# Patient Record
Sex: MALE | Race: WHITE | NOT HISPANIC OR LATINO | Employment: OTHER | URBAN - NONMETROPOLITAN AREA
[De-identification: names, ages, dates, MRNs, and addresses within clinical notes are randomized per-mention and may not be internally consistent; named-entity substitution may affect disease eponyms.]

---

## 2017-06-06 ENCOUNTER — OFFICE VISIT (OUTPATIENT)
Dept: RETAIL CLINIC | Facility: CLINIC | Age: 52
End: 2017-06-06

## 2017-06-06 DIAGNOSIS — R68.89 SUSPECTED LYME DISEASE: ICD-10-CM

## 2017-06-06 DIAGNOSIS — Z76.89 REFERRAL OF PATIENT WITHOUT EXAMINATION OR TREATMENT: Primary | ICD-10-CM

## 2017-06-06 PROCEDURE — 86618 LYME DISEASE ANTIBODY: CPT | Performed by: NURSE PRACTITIONER

## 2017-06-06 PROCEDURE — 86666 EHRLICHIA ANTIBODY: CPT | Performed by: NURSE PRACTITIONER

## 2020-08-31 PROCEDURE — U0003 INFECTIOUS AGENT DETECTION BY NUCLEIC ACID (DNA OR RNA); SEVERE ACUTE RESPIRATORY SYNDROME CORONAVIRUS 2 (SARS-COV-2) (CORONAVIRUS DISEASE [COVID-19]), AMPLIFIED PROBE TECHNIQUE, MAKING USE OF HIGH THROUGHPUT TECHNOLOGIES AS DESCRIBED BY CMS-2020-01-R: HCPCS | Performed by: NURSE PRACTITIONER

## 2021-01-09 ENCOUNTER — HOSPITAL ENCOUNTER (OUTPATIENT)
Dept: GENERAL RADIOLOGY | Facility: HOSPITAL | Age: 56
Discharge: HOME | End: 2021-01-09
Admitting: NURSE PRACTITIONER

## 2021-01-09 PROCEDURE — 80053 COMPREHEN METABOLIC PANEL: CPT | Performed by: NURSE PRACTITIONER

## 2021-01-09 PROCEDURE — 70360 X-RAY EXAM OF NECK: CPT

## 2021-01-09 PROCEDURE — 85025 COMPLETE CBC W/AUTO DIFF WBC: CPT | Performed by: NURSE PRACTITIONER

## 2021-01-13 ENCOUNTER — TELEPHONE (OUTPATIENT)
Dept: ENT CLINIC | Age: 56
End: 2021-01-13

## 2021-01-14 ENCOUNTER — TELEPHONE (OUTPATIENT)
Dept: OTOLARYNGOLOGY | Facility: CLINIC | Age: 56
End: 2021-01-14

## 2021-01-14 NOTE — TELEPHONE ENCOUNTER
Called to schedule an appointment from a referral. Patient answered and stated the the  put him on some medication last week and he thinks it is helping. He said that he is going to finish his medication and  Then see how he feels. I told him if she still felt like he needed to come in after he completed his medication that he could call us and we would put him on the schedule.

## 2022-11-08 ENCOUNTER — HOSPITAL ENCOUNTER (EMERGENCY)
Age: 57
Discharge: HOME | End: 2022-11-08
Attending: EMERGENCY MEDICINE
Payer: MEDICARE

## 2022-11-08 ENCOUNTER — APPOINTMENT (OUTPATIENT)
Dept: MRI IMAGING | Age: 57
End: 2022-11-08
Payer: MEDICARE

## 2022-11-08 VITALS
TEMPERATURE: 98.9 F | SYSTOLIC BLOOD PRESSURE: 114 MMHG | HEART RATE: 100 BPM | DIASTOLIC BLOOD PRESSURE: 82 MMHG | OXYGEN SATURATION: 99 % | RESPIRATION RATE: 18 BRPM

## 2022-11-08 DIAGNOSIS — M54.9 CHRONIC BACK PAIN, UNSPECIFIED BACK LOCATION, UNSPECIFIED BACK PAIN LATERALITY: Primary | ICD-10-CM

## 2022-11-08 DIAGNOSIS — G89.29 CHRONIC BACK PAIN, UNSPECIFIED BACK LOCATION, UNSPECIFIED BACK PAIN LATERALITY: Primary | ICD-10-CM

## 2022-11-08 LAB
ALBUMIN SERPL-MCNC: 3.9 G/DL (ref 3.5–5.2)
ALP BLD-CCNC: 60 U/L (ref 40–130)
ALT SERPL-CCNC: 32 U/L (ref 5–41)
ANION GAP SERPL CALCULATED.3IONS-SCNC: 7 MMOL/L (ref 7–19)
AST SERPL-CCNC: 22 U/L (ref 5–40)
BASOPHILS ABSOLUTE: 0 K/UL (ref 0–0.2)
BASOPHILS RELATIVE PERCENT: 0.2 % (ref 0–1)
BILIRUB SERPL-MCNC: 0.4 MG/DL (ref 0.2–1.2)
BUN BLDV-MCNC: 32 MG/DL (ref 6–20)
C-REACTIVE PROTEIN: <0.3 MG/DL (ref 0–0.5)
CALCIUM SERPL-MCNC: 9.1 MG/DL (ref 8.6–10)
CHLORIDE BLD-SCNC: 106 MMOL/L (ref 98–111)
CO2: 24 MMOL/L (ref 22–29)
CREAT SERPL-MCNC: 0.6 MG/DL (ref 0.5–1.2)
EOSINOPHILS ABSOLUTE: 0.1 K/UL (ref 0–0.6)
EOSINOPHILS RELATIVE PERCENT: 0.8 % (ref 0–5)
GFR SERPL CREATININE-BSD FRML MDRD: >60 ML/MIN/{1.73_M2}
GLUCOSE BLD-MCNC: 108 MG/DL (ref 74–109)
HCT VFR BLD CALC: 40 % (ref 42–52)
HEMOGLOBIN: 13.3 G/DL (ref 14–18)
IMMATURE GRANULOCYTES #: 0 K/UL
LYMPHOCYTES ABSOLUTE: 2.2 K/UL (ref 1.1–4.5)
LYMPHOCYTES RELATIVE PERCENT: 26.9 % (ref 20–40)
MCH RBC QN AUTO: 31.1 PG (ref 27–31)
MCHC RBC AUTO-ENTMCNC: 33.3 G/DL (ref 33–37)
MCV RBC AUTO: 93.7 FL (ref 80–94)
MONOCYTES ABSOLUTE: 0.8 K/UL (ref 0–0.9)
MONOCYTES RELATIVE PERCENT: 9 % (ref 0–10)
NEUTROPHILS ABSOLUTE: 5.2 K/UL (ref 1.5–7.5)
NEUTROPHILS RELATIVE PERCENT: 62.7 % (ref 50–65)
PDW BLD-RTO: 13.9 % (ref 11.5–14.5)
PLATELET # BLD: 159 K/UL (ref 130–400)
PMV BLD AUTO: 11.3 FL (ref 9.4–12.4)
POTASSIUM SERPL-SCNC: 4.1 MMOL/L (ref 3.5–5)
RBC # BLD: 4.27 M/UL (ref 4.7–6.1)
SEDIMENTATION RATE, ERYTHROCYTE: 63 MM/HR (ref 0–15)
SODIUM BLD-SCNC: 137 MMOL/L (ref 136–145)
TOTAL PROTEIN: 7.3 G/DL (ref 6.6–8.7)
WBC # BLD: 8.3 K/UL (ref 4.8–10.8)

## 2022-11-08 PROCEDURE — 72141 MRI NECK SPINE W/O DYE: CPT

## 2022-11-08 PROCEDURE — 6360000002 HC RX W HCPCS: Performed by: EMERGENCY MEDICINE

## 2022-11-08 PROCEDURE — 72146 MRI CHEST SPINE W/O DYE: CPT

## 2022-11-08 PROCEDURE — 96375 TX/PRO/DX INJ NEW DRUG ADDON: CPT

## 2022-11-08 PROCEDURE — 85652 RBC SED RATE AUTOMATED: CPT

## 2022-11-08 PROCEDURE — 36415 COLL VENOUS BLD VENIPUNCTURE: CPT

## 2022-11-08 PROCEDURE — 72148 MRI LUMBAR SPINE W/O DYE: CPT

## 2022-11-08 PROCEDURE — 86140 C-REACTIVE PROTEIN: CPT

## 2022-11-08 PROCEDURE — 85025 COMPLETE CBC W/AUTO DIFF WBC: CPT

## 2022-11-08 PROCEDURE — 96374 THER/PROPH/DIAG INJ IV PUSH: CPT

## 2022-11-08 PROCEDURE — 99284 EMERGENCY DEPT VISIT MOD MDM: CPT

## 2022-11-08 PROCEDURE — 72141 MRI NECK SPINE W/O DYE: CPT | Performed by: RADIOLOGY

## 2022-11-08 PROCEDURE — 80053 COMPREHEN METABOLIC PANEL: CPT

## 2022-11-08 RX ORDER — METHYLPREDNISOLONE 4 MG/1
TABLET ORAL
Qty: 1 KIT | Refills: 0 | Status: ON HOLD | OUTPATIENT
Start: 2022-11-08 | End: 2022-11-28 | Stop reason: HOSPADM

## 2022-11-08 RX ORDER — GABAPENTIN 400 MG/1
600 CAPSULE ORAL 3 TIMES DAILY
Status: ON HOLD | COMMUNITY
Start: 2020-08-28 | End: 2022-11-28 | Stop reason: HOSPADM

## 2022-11-08 RX ORDER — MORPHINE SULFATE 4 MG/ML
4 INJECTION, SOLUTION INTRAMUSCULAR; INTRAVENOUS ONCE
Status: COMPLETED | OUTPATIENT
Start: 2022-11-08 | End: 2022-11-08

## 2022-11-08 RX ORDER — TIZANIDINE 4 MG/1
4 TABLET ORAL EVERY 6 HOURS PRN
COMMUNITY

## 2022-11-08 RX ORDER — DEXAMETHASONE SODIUM PHOSPHATE 10 MG/ML
10 INJECTION, SOLUTION INTRAMUSCULAR; INTRAVENOUS ONCE
Status: COMPLETED | OUTPATIENT
Start: 2022-11-08 | End: 2022-11-08

## 2022-11-08 RX ORDER — CEPHALEXIN 500 MG/1
500 CAPSULE ORAL 4 TIMES DAILY
Qty: 28 CAPSULE | Refills: 0 | Status: SHIPPED | OUTPATIENT
Start: 2022-11-08 | End: 2022-11-15

## 2022-11-08 RX ORDER — OXYCODONE AND ACETAMINOPHEN 7.5; 325 MG/1; MG/1
1 TABLET ORAL EVERY 6 HOURS PRN
Status: ON HOLD | COMMUNITY
End: 2022-11-20

## 2022-11-08 RX ORDER — MELOXICAM 7.5 MG/1
7.5 TABLET ORAL DAILY
COMMUNITY
Start: 2020-07-13

## 2022-11-08 RX ORDER — ONDANSETRON 2 MG/ML
4 INJECTION INTRAMUSCULAR; INTRAVENOUS ONCE
Status: COMPLETED | OUTPATIENT
Start: 2022-11-08 | End: 2022-11-08

## 2022-11-08 RX ADMIN — DEXAMETHASONE SODIUM PHOSPHATE 10 MG: 10 INJECTION, SOLUTION INTRAMUSCULAR; INTRAVENOUS at 23:39

## 2022-11-08 RX ADMIN — MORPHINE SULFATE 4 MG: 4 INJECTION, SOLUTION INTRAMUSCULAR; INTRAVENOUS at 20:23

## 2022-11-08 RX ADMIN — ONDANSETRON 4 MG: 2 INJECTION INTRAMUSCULAR; INTRAVENOUS at 20:24

## 2022-11-08 ASSESSMENT — ENCOUNTER SYMPTOMS
RECTAL PAIN: 0
CHEST TIGHTNESS: 0
FACIAL SWELLING: 0
DIARRHEA: 0
PHOTOPHOBIA: 0
EYE DISCHARGE: 0
ABDOMINAL PAIN: 0
WHEEZING: 0
NAUSEA: 0
BACK PAIN: 1
SHORTNESS OF BREATH: 0
CONSTIPATION: 0
EYE ITCHING: 0
COUGH: 0
EYE PAIN: 0

## 2022-11-08 ASSESSMENT — PAIN SCALES - GENERAL: PAINLEVEL_OUTOF10: 3

## 2022-11-08 ASSESSMENT — PAIN DESCRIPTION - LOCATION: LOCATION: BACK

## 2022-11-09 NOTE — ED PROVIDER NOTES
[facility] EMERGENCY DEPT  eMERGENCYdEPARTMENT eNCOUnter      Pt Name: Karime Sams  MRN: [id number]  [place] 1965  Date of evaluation: 11/8/2022  Betty Wharton MD    Emergency Department care of this patient was assumed at 2200 from Dr. Benja Rg. We have discussed the case and the plan of care. I have seen and evaluated patient and reviewed ED course. CHIEF COMPLAINT       Chief Complaint   Patient presents with    Back Pain     Ongoing for several years. Family states he has insect bite, is dragging leg, has neck and back issues. PHYSICAL EXAM    (up to 7 for level 4, 8 or more for level 5)     ED Triage Vitals [11/08/22 1645]   BP Temp Temp Source Heart Rate Resp SpO2 Height Weight   (!) 142/98 98.9 °F (37.2 °C) Oral (!) 105 18 99 % -- --       Physical Exam    DIAGNOSTIC RESULTS     EKG: All EKG's are interpreted by the Emergency Department Physician who either signs or Co-signs this chart inthe absence of a cardiologist.        RADIOLOGY:   Non-plain film imagessuch as CT, Ultrasound and MRI are read by the radiologist. Plain radiographic images are visualized and preliminarily interpreted by the emergency physician with the below findings:          MRI LUMBAR SPINE WO CONTRAST   Final Result   1. Severe bilateral foraminal stenosis at L5-S1, multifactorial.  Discogenic marrow edema  indicates a more acute component to the degenerative change at this level. 2. Degenerative change elsewhere in the lumbar spine, most pronounced    at T11-T12 on the left, L1-L2 where there is borderline central spinal stenosis, and L2-L3 where there is moderate right foraminal stenosis. 3. No abnormal signal in the conus. 4. Other than borderline central spinal stenosis at L1-L2, central spinal    canal is patent throughout. Electronically signed by Clementina Holt M.D. on 11-08-22 at [facility]   Final Result   1.   Moderate to severe degenerative disc disease throughout the Patient is dead based on Cardiopulmonary criteria including absent breath sounds, pulselessness and/or asystole thoracic spine, including multilevel areas of discogenic marrow edema. 2. Findings are most severe at T2-3 and T9-10, where there is borderline central spinal stenosis. 3. No    isolated disc herniation, significant central spinal stenosis or abnormal cord signal..Electronically signed by Ada Paniagua M.D. on 11-08-22 at 2247       [facility]   Final Result   1. Cervical spondylosis. 2. Mild retrolisthesis of C3 over C4.   3. Varying degrees of disc osteophyte complex, facet arthropathy, uncovertebral arthropathy causing spinal canal, neural foraminal narrowing with nerve root impingement as detailed above. 4. Spinal canal stenosis with cord compression at C3, C4 levels. Recommendation:    Follow up as clinically indicated. Electronically Signed by Carina Burk MD at 08-Nov-2022 09:46:14 PM EST                       LABS:  Labs Reviewed   CBC WITH AUTO DIFFERENTIAL - Abnormal; Notable for the following components:       Result Value    RBC 4.27 (*)     Hemoglobin 13.3 (*)     Hematocrit 40.0 (*)     MCH 31.1 (*)     All other components within normal limits   COMPREHENSIVE METABOLIC PANEL - Abnormal; Notable for the following components:    BUN 32 (*)     All other components within normal limits   SEDIMENTATION RATE - Abnormal; Notable for the following components:    Sed Rate 63 (*)     All other components within normal limits   C-REACTIVE PROTEIN       All other labs were within normal range or not returned as of this dictation.     EMERGENCY DEPARTMENT COURSE and DIFFERENTIAL DIAGNOSIS/MDM:   Vitals:    Vitals:    11/08/22 1645 11/08/22 2350   BP: (!) 142/98 114/82   Pulse: (!) 105 100   Resp: 18 18   Temp: 98.9 °F (37.2 °C)    TempSrc: Oral    SpO2: 99%        MDM     Amount and/or Complexity of Data Reviewed  Tests in the radiology section of CPT®: ordered and reviewed  Discuss the patient with other providers: yes      Patient is able to ambulate here with minimal difficulty. No obvious focal weakness mild weakness appreciated in his  strength and against resistance of his lower extremities. No bowel or bladder incontinence history or saddle anesthesia. Patient has dealt with chronic back pain issues for many years followed by pain management and has seen Dr. Talia Pritchard. Has had worsening symptoms over the past 1 week describes electric jolt sensations in his extremities. Assumed care tonight from Dr. Dada Cruz to follow-up MRI thoracic and lumbar spine with plans for discussion with neurosurgery for their input. Pt also has a small bug bite with plan to send home with keflex if DC. Discussed the patients case and all imaging with Dr. Matthew Nog. [id number] Nas[address] Roy for close follow up with him or Dr. Joan Finn and start on steroid. Discussed strict return precautions with patient. CONSULTS:  None    PROCEDURES:  Unless otherwise noted below, none     Procedures    FINAL IMPRESSION      1.  Chronic back pain, unspecified back location, unspecified back pain laterality          DISPOSITION/PLAN   DISPOSITION Decision To Discharge    PATIENT REFERRED TO:  MD Mitchell Gordillo [address] [place] [address]  [phone]          DISCHARGE MEDICATIONS:  Discharge Medication List as of 11/8/2022 11:47 PM        START taking these medications    Details   methylPREDNISolone (MEDROL, ALIYAH,) 4 MG tablet Take by mouth., Disp-1 kit, R-0Normal                (Please note that portions ofthis note were completed with a voice recognition program.  Efforts were made to edit the dictations but occasionally words are mis-transcribed.)    David Blanco MD(electronically signed)  Attending Emergency Physician         Cecelia Bernabe MD  11/09/22 1336

## 2022-11-09 NOTE — ED PROVIDER NOTES
[facility] EMERGENCY DEPT  eMERGENCY dEPARTMENT eNCOUnter      Pt Name: Juliette Aleman  MRN: [id number]  [place] 1965  Date of evaluation: 11/8/2022  Provider: Sariah Monique MD    CHIEF COMPLAINT       Chief Complaint   Patient presents with    Back Pain     Ongoing for several years. Family states he has insect bite, is dragging leg, has neck and back issues. HISTORY OF PRESENT ILLNESS   (Location/Symptom, Timing/Onset,Context/Setting, Quality, Duration, Modifying Factors, Severity)  Note limiting factors. Juliette Aleman is a 62 y.o. male who presents to the emergency department numb hands and bilateral lower extremity paresthesia. HPI    Patient is a 78-year-old white male with a long history of neck; back ;and lumbar issues had been seen by pain management in the orthopedic [facility] [address] [place] [address] for years. He is on gabapentin; oxycodone and zanaflex. He last epidural injections in February 2022; notes now with worsening symptoms including new findings including bilateral hand paresthesia; electric shocks when he moves his neck; electric shock paresthesia down his legs and back when he tries to stand up bilaterally. Can barely walk and drags his feet and his right lower extremity gives out frequently when he tries to ambulate. He is told that he needs surgery but has refused to obtain it. At this time, Imaging studies are unavailable as are history of the patient's prior episodes and treatment. Having trouble controlling his legs and being able to stand upright which is what brought him to the ED. He has a pending surgical evaluation in the next few weeks. He denies focal weakness; fever; change in bowel or bladder control. He is not here for pain control but requests diagnostic studies because he thinks his condition is acutely worsening over the past week. NursingNotes were reviewed.     REVIEW OF SYSTEMS    (2-9 systems for level 4, 10 or more for level 5)     Review of Systems   Constitutional:  Negative for activity change, appetite change, chills, diaphoresis, fatigue and fever. HENT:  Negative for congestion, facial swelling, hearing loss and nosebleeds. Eyes:  Negative for photophobia, pain, discharge and itching. Respiratory:  Negative for cough, chest tightness, shortness of breath and wheezing. Cardiovascular:  Negative for chest pain and palpitations. Gastrointestinal:  Negative for abdominal pain, constipation, diarrhea, nausea and rectal pain. Endocrine: Negative for cold intolerance and heat intolerance. Genitourinary:  Negative for difficulty urinating and dysuria. Musculoskeletal:  Positive for back pain, gait problem and neck pain. Negative for arthralgias, joint swelling and neck stiffness. Skin:  Negative for pallor and rash. Neurological:  Positive for weakness and numbness. Negative for dizziness, speech difficulty and headaches. Psychiatric/Behavioral:  Negative for agitation, behavioral problems and decreased concentration. PAST MEDICALHISTORY   History reviewed. No pertinent past medical history. SURGICAL HISTORY     History reviewed. No pertinent surgical history. CURRENT MEDICATIONS     Discharge Medication List as of 11/8/2022 11:47 PM        CONTINUE these medications which have NOT CHANGED    Details   gabapentin (NEURONTIN) 400 MG capsule Take 400 mg by mouth 3 times daily. Historical Med      meloxicam (MOBIC) 7.5 MG tablet Take 7.5 mg by mouth dailyHistorical Med      oxyCODONE-acetaminophen (PERCOCET) 7.5-325 MG per tablet Take 1 tablet by mouth 3 times daily. Historical Med      tiZANidine (ZANAFLEX) 4 MG tablet Take 4 mg by mouth every 6 hours as neededHistorical Med             ALLERGIES     Patient has no known allergies. FAMILY HISTORY     History reviewed. No pertinent family history.        SOCIAL HISTORY       Social History     Socioeconomic History    Marital status: Legally      Spouse name: None    Number of children: None    Years of education: None    Highest education level: None   Tobacco Use    Smoking status: Every Day     Types: Cigarettes    Smokeless tobacco: Never   Substance and Sexual Activity    Alcohol use: Not Currently    Drug use: Not Currently       SCREENINGS    Leon Coma Scale  Eye Opening: Spontaneous  Best Verbal Response: Oriented  Best Motor Response: Obeys commands  Leon Coma Scale Score: 15        PHYSICAL EXAM    (up to 7 for level 4, 8 or more for level 5)     ED Triage Vitals [11/08/22 1645]   BP Temp Temp Source Heart Rate Resp SpO2 Height Weight   (!) 142/98 98.9 °F (37.2 °C) Oral (!) 105 18 99 % -- --       Physical Exam  Vitals and nursing note reviewed. Constitutional:       Appearance: He is well-developed. HENT:      Head: Normocephalic and atraumatic. Mouth/Throat:      Mouth: Mucous membranes are moist.   Eyes:      General:         Right eye: No discharge. Left eye: No discharge. Conjunctiva/sclera: Conjunctivae normal.      Pupils: Pupils are equal, round, and reactive to light. Neck:      Comments: Diffuse paraspinal and midline pain  Cardiovascular:      Rate and Rhythm: Normal rate and regular rhythm. Heart sounds: Normal heart sounds. Pulmonary:      Effort: Pulmonary effort is normal. No respiratory distress. Breath sounds: Normal breath sounds. No wheezing or rales. Abdominal:      General: Bowel sounds are normal.      Palpations: Abdomen is soft. There is no mass. Tenderness: There is no abdominal tenderness. There is no guarding or rebound. Musculoskeletal:         General: No tenderness. Normal range of motion. Cervical back: Normal range of motion and neck supple. Comments: mid thoracic and lower lumbar spinal and paraspinal pain   Skin:     General: Skin is warm and dry. Capillary Refill: Capillary refill takes less than 2 seconds.    Neurological:      Mental Status: He is alert and oriented to person, place, and time. Sensory: Sensory deficit present. Gait: Gait abnormal.      Comments: Numbness to bilateral hands; paresthesias to bilateral legs; denies perianal anesthesia or focal weakness   Psychiatric:         Behavior: Behavior normal.         Thought Content: Thought content normal.         Judgment: Judgment normal.     DIAGNOSTIC RESULTS     EKG: All EKG's areinterpreted by the Emergency Department Physician who either signs or Co-signs this chart in the absence of a cardiologist.        RADIOLOGY:  Non-plain film images such as CT, Ultrasound and MRI are read by the radiologist. Plain radiographic images are visualized and preliminarily interpreted bythe emergency physician with the below findings:          MRI LUMBAR SPINE WO CONTRAST   Final Result   1. Severe bilateral foraminal stenosis at L5-S1, multifactorial.  Discogenic marrow edema  indicates a more acute component to the degenerative change at this level. 2. Degenerative change elsewhere in the lumbar spine, most pronounced    at T11-T12 on the left, L1-L2 where there is borderline central spinal stenosis, and L2-L3 where there is moderate right foraminal stenosis. 3. No abnormal signal in the conus. 4. Other than borderline central spinal stenosis at L1-L2, central spinal    canal is patent throughout. Electronically signed by Yamilet Nunes M.D. on 11-08-22 at [facility]   Final Result   1. Moderate to severe degenerative disc disease throughout the thoracic spine, including multilevel areas of discogenic marrow edema. 2. Findings are most severe at T2-3 and T9-10, where there is borderline central spinal stenosis. 3. No    isolated disc herniation, significant central spinal stenosis or abnormal cord signal..Electronically signed by Yamilet Nunes M.D. on 11-08-22 at [address] [place] [address]   Final Result   1. Cervical spondylosis.    2. Mild retrolisthesis of C3 over C4.   3. Varying degrees of disc osteophyte complex, facet arthropathy, uncovertebral arthropathy causing spinal canal, neural foraminal narrowing with nerve root impingement as detailed above. 4. Spinal canal stenosis with cord compression at C3, C4 levels. Recommendation:    Follow up as clinically indicated. Electronically Signed by Ericka Castro MD at 08-Nov-2022 09:46:14 PM EST                       LABS:  Labs Reviewed   CBC WITH AUTO DIFFERENTIAL - Abnormal; Notable for the following components:       Result Value    RBC 4.27 (*)     Hemoglobin 13.3 (*)     Hematocrit 40.0 (*)     MCH 31.1 (*)     All other components within normal limits   COMPREHENSIVE METABOLIC PANEL - Abnormal; Notable for the following components:    BUN 32 (*)     All other components within normal limits   SEDIMENTATION RATE - Abnormal; Notable for the following components:    Sed Rate 63 (*)     All other components within normal limits   C-REACTIVE PROTEIN       All other labs were within normal range or not returned as of this dictation. EMERGENCY DEPARTMENT COURSE and DIFFERENTIAL DIAGNOSIS/MDM:   Vitals:    Vitals:    11/08/22 1645 11/08/22 2350   BP: (!) 142/98 114/82   Pulse: (!) 105 100   Resp: 18 18   Temp: 98.9 °F (37.2 °C)    TempSrc: Oral    SpO2: 99%        MDM    Pain controlled in the  ED. Endorsed to Dr. Yosvany Saldana to follow up on MRI results. CONSULTS:  None    PROCEDURES:  Unless otherwise noted below, none     Procedures    FINAL IMPRESSION      1.  Chronic back pain, unspecified back location, unspecified back pain laterality          DISPOSITION/PLAN   DISPOSITION Decision To Discharge 11/08/2022 11:31:10 PM      PATIENT REFERRED TO:  MD Mitchell Dobbs [address] [place] [address] Nadya Bryan MD  [address] [place] [address]  [phone]          DISCHARGE MEDICATIONS:  Discharge Medication List as of 11/8/2022 11:47 PM        START

## 2022-11-10 ENCOUNTER — TELEPHONE (OUTPATIENT)
Dept: NEUROSURGERY | Age: 57
End: 2022-11-10

## 2022-11-10 DIAGNOSIS — M54.9 BACK PAIN, UNSPECIFIED BACK LOCATION, UNSPECIFIED BACK PAIN LATERALITY, UNSPECIFIED CHRONICITY: Primary | ICD-10-CM

## 2022-11-10 NOTE — TELEPHONE ENCOUNTER
1st attempt to contact patient.  I left a voicemail instructing patient to call back at [phone] to schedule their new patient appointment     CALL ABOUT ED REFERRAL    BACK PAIN    MRI

## 2022-11-10 NOTE — TELEPHONE ENCOUNTER
[facility] New Patient Questionnaire    Diagnosis/Reason for Referral?    BACK PAIN, ED REFERRAL    2. Who is completing questionnaire? Patient  Caregiver Family      3. Has the patient had any previous spinal/brain surgeries? NO      A. If yes, what is the name of the facility in which the surgery was performed? B. Procedure/Surgery performed? C. Who was the surgeon? D. When was the surgery? MM/YY       E. Did the patient improve after the surgery? 4. Is this a second opinion? If yes, Dr. Vivian Mensah would like to review patient first before making the appointment. NO    5. Have MRI Images been obtain within the last year? Yes  No      XR  CT     If yes, where was the imaging performed? [facility]   If yes, what part of the body? Lumbar  Cervical  Thoracic  Brain     If yes, when was it obtained? 11/8/22    Note: if the scan was performed at a facility other than [address] [place] [address], the disc will need to be brought to the appointment or we need to reach out to obtain the disc. A. Was the patient instructed to provide the disc? Yes   No      8. Has the patient had a NCV/EMG within the last year? Yes  No     If yes, where was it performed and date? MM/YY  Location:      9. Has the patient been to Physical Therapy? Yes  No     If yes, what location, how long attended, and last visit? Location:        Therapy Lasted:    Date of Last Visit:      10. Has the patient been to Pain Management? Yes  No     If yes, what location and last visit     [facility]   Last Visit: LAST WEEK   Is it helping?  NO

## 2022-11-14 ENCOUNTER — OFFICE VISIT (OUTPATIENT)
Dept: NEUROSURGERY | Age: 57
End: 2022-11-14
Payer: MEDICARE

## 2022-11-14 VITALS
SYSTOLIC BLOOD PRESSURE: 145 MMHG | RESPIRATION RATE: 18 BRPM | HEART RATE: 80 BPM | WEIGHT: 165 LBS | BODY MASS INDEX: 25.9 KG/M2 | HEIGHT: 67 IN | DIASTOLIC BLOOD PRESSURE: 82 MMHG

## 2022-11-14 DIAGNOSIS — M43.17 SPONDYLOLISTHESIS OF LUMBOSACRAL REGION: ICD-10-CM

## 2022-11-14 DIAGNOSIS — M50.30 DDD (DEGENERATIVE DISC DISEASE), CERVICAL: ICD-10-CM

## 2022-11-14 DIAGNOSIS — G95.20 CERVICAL SPINAL CORD COMPRESSION (HCC): ICD-10-CM

## 2022-11-14 DIAGNOSIS — G95.9 CERVICAL MYELOPATHY (HCC): Primary | ICD-10-CM

## 2022-11-14 PROCEDURE — 99205 OFFICE O/P NEW HI 60 MIN: CPT | Performed by: NEUROLOGICAL SURGERY

## 2022-11-14 ASSESSMENT — ENCOUNTER SYMPTOMS
EYES NEGATIVE: 1
GASTROINTESTINAL NEGATIVE: 1
BACK PAIN: 1
RESPIRATORY NEGATIVE: 1

## 2022-11-14 NOTE — PROGRESS NOTES
[facility] Neurosurgery  Office Visit        Chief Complaint   Patient presents with    New Patient     Establishing care    Results     MRI C/T/L 11/8/22    Back Pain     Patient states that his back pain started 30 years ago and is getting worse. He takes prescribed percocet, gabapentin for pain and states that medication helps somewhat. He goes to [facility] and was receiving INJ but stopped last year. Neck Pain     Patient states that he has neck pain as well. Numbness     Patient states that he has numbness and tingling in his b/l legs, feet and hands. He states that the numbness in his hands is causing weakness and he is unable to use his hands to button things, etc.       HISTORY OF PRESENT ILLNESS:      The patient is a 62 y.o. male who presents as a referral from the [facility] for multiple complains. Chiefly, he complains of numbness and weakness in his hands and difficulty walking. He also complains of lower back pain and neck pain. The lower back pain has been a chronic issue for him for years but the issues with numbness and weakness in his hands have gotten significantly worse over the past few months. He also complains of worsening balance and multiple falls. He also describes a shock-like sensation throughout his spine when he attempts to stand up straight. He is currently in pain management for lower back pain and neck pain and takes 7.5mg percocet as well as gabapentin, meloxicam and tizanidine. Regarding his hands, he states his hands are numb and weak and he has difficulty buttoning his clothing. He reports difficulty with balance and has had multiple falls due to feeling that his legs are \"paralyzed\". He denies any difficulty with bowel or bladder control. MEDICAL HISTORY:             History reviewed. No pertinent past medical history. No past surgical history on file.       Current Outpatient Medications:     gabapentin (NEURONTIN) 400 MG capsule, Take 400 mg by mouth 3 times daily., Disp: , Rfl:     meloxicam (MOBIC) 7.5 MG tablet, Take 7.5 mg by mouth daily, Disp: , Rfl:     oxyCODONE-acetaminophen (PERCOCET) 7.5-325 MG per tablet, Take 1 tablet by mouth 3 times daily. , Disp: , Rfl:     tiZANidine (ZANAFLEX) 4 MG tablet, Take 4 mg by mouth every 6 hours as needed, Disp: , Rfl:     methylPREDNISolone (MEDROL, ALIYAH,) 4 MG tablet, Take by mouth., Disp: 1 kit, Rfl: 0    cephALEXin (KEFLEX) 500 MG capsule, Take 1 capsule by mouth 4 times daily for 7 days, Disp: 28 capsule, Rfl: 0    Allergies:  Patient has no known allergies. Social History:   Social History     Tobacco Use   Smoking Status Every Day    Types: Cigarettes   Smokeless Tobacco Never     Social History     Substance and Sexual Activity   Alcohol Use Not Currently         Family History:   No family history on file. REVIEW OF SYSTEMS:    Constitutional: Negative. HENT: Negative. Eyes: Negative. Respiratory: Negative. Cardiovascular: Negative. Gastrointestinal: Negative. Genitourinary: Negative. Musculoskeletal:  Positive for back pain, falls, joint pain, myalgias and neck pain. Skin: Negative. Neurological:  Positive for tingling and weakness. Endo/Heme/Allergies: Negative. Psychiatric/Behavioral: Negative. Review of Systems was obtained by the medical assistant and reviewed by myself. PHYSICAL EXAM:    Vitals:    11/14/22 0900   BP: (!) 145/82   Pulse: 80   Resp: 18       Constitutional: Appears well-developed and well-nourished.    Eyes - conjunctiva normal.  Pupils react to light  Ear, nose,throat -Normal pinna and tragus, No scars, or lesions over external nose or ears, no obvious atrophy of tongue  Neck- symmetric, trachea midline, no jugular vein distension  Respiration- chest wall symmetric, normal effort without use of accessory muscles  Musculoskeletal - no significant wasting of muscles noted, no bony deformities, symmetric bulk  Extremities- no clubbing, cyanosis or edema  Skin - warm, dry, and intact. No rash,erythema, or pallor.   Psychiatric - mood, affect, and behavior appear normal.       NEUROLOGIC EXAM:    MENTAL STATUS: Alert, oriented, thought content appropriate    CRANIAL NERVES: PERRL, EOMI, symmetric facies, tongue midline    MOTOR:     Right Upper Extremity:    Deltoid: 5/5  Biceps: 5/5  Triceps: 5/5  Wrist Extension: 5/5  Finger Abduction: 4/5  : 4/5    Left Upper Extremity:    Deltoid: 5/5  Biceps: 5/5  Triceps: 5/5  Wrist Extension: 5/5  Finger Abduction: 4/5  : 4/5    Right Lower Extremity:    Hip Flexion: 5/5  Knee Extension: 5/5  Ankle Plantarflexion: 5/5  Ankle Dorsiflexion: 5/5      Left Lower Extremity:    Hip Flexion: 5/5  Knee Extension: 5/5  Ankle Plantarflexion: 5/5  Ankle Dorsiflexion: 5/5      SOMATOSENSORY:     Right Upper Extremity: Decreased to light touch and pinprick in the hand, non-dermatomal  Left Upper Extremity: Decreased to light touch and pinprick in the hand, non-dermatomal  Right Lower Extremity: Decreased to light touch in a stocking-distribution to the mid-calf  Left Lower Extremity: Decreased to light touch in a stocking-distribution to the mid-calf      REFLEXES:    Biceps: 1+  Patella: 3+  Ankle Jerk: 2+    Lyon's: Positive bilaterally      COORDINATION and GAIT:    Gait: Unsteady, unable to attempt tandem gait      DATA:    Lab Results   Component Value Date    WBC 8.3 11/08/2022    HGB 13.3 (L) 11/08/2022    HCT 40.0 (L) 11/08/2022    MCV 93.7 11/08/2022     11/08/2022     Lab Results   Component Value Date     11/08/2022    K 4.1 11/08/2022     11/08/2022    CO2 24 11/08/2022    BUN 32 (H) 11/08/2022    CREATININE 0.6 11/08/2022    GLUCOSE 108 11/08/2022    CALCIUM 9.1 11/08/2022    PROT 7.3 11/08/2022    LABALBU 3.9 11/08/2022    BILITOT 0.4 11/08/2022    ALKPHOS 60 11/08/2022    AST 22 11/08/2022    ALT 32 11/08/2022    LABGLOM >60 11/08/2022       IMAGING:    My interpretation of imaging studies:     MRI of the cervical, thoracic and lumbar spine reviewed. Cervical spine: There is advanced multilevel spondylosis and DDD. There is straightening of the natural cervical lordosis. 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 At C3/4 there is a large disc/osteophyte as well as facet/ligament hypertrophy that causes severe spinal stenosis and spinal cord compression. There is abnormal STIR signal within the cord. There is moderately-severe spinal stenosis at C4/5 and C5/6 and moderate canal stenosis at C6/7. There is severe bilateral foraminal stenosis at C3/4, C4/5 and C5/6 and L>R foraminal stenosis at C6/7. Thoracic and lumbar spine: There is advanced multilevel DDD and spondylosis. There is no high-grade canal stenosis at any level. There is grade II spondylolisthesis at L5/S1 that causes severe bilateral foraminal stenosis. ASSESSMENT AND PLAN:  This is a 62 y.o. male with a longstanding history of lower back and neck pain who presents with progressive bilateral hand numbness and weakness, difficulty walking and worsening neck pain. He is myelopathic on physical exam with a markedly abnormal tandem gait, brisk reflexes, hand numbness/weakness and bilateral Lyon's. His MRI reveals multilevel cervical stenosis and spinal cord compression at C4/5 as well as OPLL but without any significant kyphotic deformity. Given his progressive symptoms and the appearance of his  imaging, I have recommended surgery -- C3-6 laminectomy and fusion. I explained the procedure to him in detail using his imaging and anatomic models. Specific risks of bleeding, infection, neurologic injury/paralysis, CSF leak, hardware failure/misplacement, nonunion and cardiopulmonary complications were all discussed.   I also explained that while we hope for neurologic improvement after surgery, the goal of surgery would be to prevent and further worsening of his symptoms. He voiced understanding and requested that we proceed. I strongly recommended that he quit smoking to improve his chances of a good surgical outcome.             Christian Tsang MD

## 2022-11-14 NOTE — PROGRESS NOTES
Review of Systems   Constitutional: Negative. HENT: Negative. Eyes: Negative. Respiratory: Negative. Cardiovascular: Negative. Gastrointestinal: Negative. Genitourinary: Negative. Musculoskeletal:  Positive for back pain, falls, joint pain, myalgias and neck pain. Skin: Negative. Neurological:  Positive for tingling and weakness. Endo/Heme/Allergies: Negative. Psychiatric/Behavioral: Negative.

## 2022-11-16 DIAGNOSIS — Z01.818 PRE-OP TESTING: Primary | ICD-10-CM

## 2022-11-20 ENCOUNTER — HOSPITAL ENCOUNTER (INPATIENT)
Age: 57
LOS: 8 days | Discharge: HOME HEALTH | DRG: 472 | End: 2022-11-28
Attending: EMERGENCY MEDICINE | Admitting: INTERNAL MEDICINE
Payer: MEDICARE

## 2022-11-20 DIAGNOSIS — G95.9 CERVICAL MYELOPATHY (HCC): Primary | ICD-10-CM

## 2022-11-20 DIAGNOSIS — M54.9 CHRONIC BACK PAIN, UNSPECIFIED BACK LOCATION, UNSPECIFIED BACK PAIN LATERALITY: ICD-10-CM

## 2022-11-20 DIAGNOSIS — G95.20 CERVICAL SPINAL CORD COMPRESSION (HCC): ICD-10-CM

## 2022-11-20 DIAGNOSIS — G89.29 CHRONIC BACK PAIN, UNSPECIFIED BACK LOCATION, UNSPECIFIED BACK PAIN LATERALITY: ICD-10-CM

## 2022-11-20 DIAGNOSIS — M43.17 SPONDYLOLISTHESIS OF LUMBOSACRAL REGION: ICD-10-CM

## 2022-11-20 PROBLEM — M62.830 BACK SPASM: Status: ACTIVE | Noted: 2022-11-20

## 2022-11-20 LAB
ALBUMIN SERPL-MCNC: 4.2 G/DL (ref 3.5–5.2)
ALP BLD-CCNC: 72 U/L (ref 40–130)
ALT SERPL-CCNC: 34 U/L (ref 5–41)
ANION GAP SERPL CALCULATED.3IONS-SCNC: 7 MMOL/L (ref 7–19)
APTT: 27.5 SEC (ref 26–36.2)
AST SERPL-CCNC: 26 U/L (ref 5–40)
BASOPHILS ABSOLUTE: 0 K/UL (ref 0–0.2)
BASOPHILS RELATIVE PERCENT: 0.3 % (ref 0–1)
BILIRUB SERPL-MCNC: 0.5 MG/DL (ref 0.2–1.2)
BUN BLDV-MCNC: 18 MG/DL (ref 6–20)
CALCIUM SERPL-MCNC: 9.8 MG/DL (ref 8.6–10)
CHLORIDE BLD-SCNC: 100 MMOL/L (ref 98–111)
CO2: 30 MMOL/L (ref 22–29)
CREAT SERPL-MCNC: 0.6 MG/DL (ref 0.5–1.2)
EOSINOPHILS ABSOLUTE: 0.1 K/UL (ref 0–0.6)
EOSINOPHILS RELATIVE PERCENT: 1.2 % (ref 0–5)
GFR SERPL CREATININE-BSD FRML MDRD: >60 ML/MIN/{1.73_M2}
GLUCOSE BLD-MCNC: 176 MG/DL (ref 74–109)
HCT VFR BLD CALC: 42.9 % (ref 42–52)
HEMOGLOBIN: 14 G/DL (ref 14–18)
IMMATURE GRANULOCYTES #: 0 K/UL
INR BLD: 0.95 (ref 0.88–1.18)
LYMPHOCYTES ABSOLUTE: 1.6 K/UL (ref 1.1–4.5)
LYMPHOCYTES RELATIVE PERCENT: 21.3 % (ref 20–40)
MCH RBC QN AUTO: 30.6 PG (ref 27–31)
MCHC RBC AUTO-ENTMCNC: 32.6 G/DL (ref 33–37)
MCV RBC AUTO: 93.7 FL (ref 80–94)
MONOCYTES ABSOLUTE: 0.4 K/UL (ref 0–0.9)
MONOCYTES RELATIVE PERCENT: 4.8 % (ref 0–10)
NEUTROPHILS ABSOLUTE: 5.5 K/UL (ref 1.5–7.5)
NEUTROPHILS RELATIVE PERCENT: 72 % (ref 50–65)
PDW BLD-RTO: 14.1 % (ref 11.5–14.5)
PLATELET # BLD: 183 K/UL (ref 130–400)
PMV BLD AUTO: 10.9 FL (ref 9.4–12.4)
POTASSIUM SERPL-SCNC: 4.2 MMOL/L (ref 3.5–5)
PROTHROMBIN TIME: 12.6 SEC (ref 12–14.6)
RBC # BLD: 4.58 M/UL (ref 4.7–6.1)
SARS-COV-2, NAAT: NOT DETECTED
SODIUM BLD-SCNC: 137 MMOL/L (ref 136–145)
TOTAL PROTEIN: 8 G/DL (ref 6.6–8.7)
WBC # BLD: 7.7 K/UL (ref 4.8–10.8)

## 2022-11-20 PROCEDURE — 36415 COLL VENOUS BLD VENIPUNCTURE: CPT

## 2022-11-20 PROCEDURE — 1210000000 HC MED SURG R&B

## 2022-11-20 PROCEDURE — 99285 EMERGENCY DEPT VISIT HI MDM: CPT

## 2022-11-20 PROCEDURE — 6360000002 HC RX W HCPCS: Performed by: EMERGENCY MEDICINE

## 2022-11-20 PROCEDURE — 6370000000 HC RX 637 (ALT 250 FOR IP)

## 2022-11-20 PROCEDURE — 85610 PROTHROMBIN TIME: CPT

## 2022-11-20 PROCEDURE — 85025 COMPLETE CBC W/AUTO DIFF WBC: CPT

## 2022-11-20 PROCEDURE — 87635 SARS-COV-2 COVID-19 AMP PRB: CPT

## 2022-11-20 PROCEDURE — 94760 N-INVAS EAR/PLS OXIMETRY 1: CPT

## 2022-11-20 PROCEDURE — 6370000000 HC RX 637 (ALT 250 FOR IP): Performed by: INTERNAL MEDICINE

## 2022-11-20 PROCEDURE — 96375 TX/PRO/DX INJ NEW DRUG ADDON: CPT

## 2022-11-20 PROCEDURE — 2580000003 HC RX 258

## 2022-11-20 PROCEDURE — 85730 THROMBOPLASTIN TIME PARTIAL: CPT

## 2022-11-20 PROCEDURE — 80053 COMPREHEN METABOLIC PANEL: CPT

## 2022-11-20 PROCEDURE — 6370000000 HC RX 637 (ALT 250 FOR IP): Performed by: HOSPITALIST

## 2022-11-20 PROCEDURE — 96374 THER/PROPH/DIAG INJ IV PUSH: CPT

## 2022-11-20 RX ORDER — DEXAMETHASONE SODIUM PHOSPHATE 10 MG/ML
10 INJECTION, SOLUTION INTRAMUSCULAR; INTRAVENOUS ONCE
Status: COMPLETED | OUTPATIENT
Start: 2022-11-20 | End: 2022-11-20

## 2022-11-20 RX ORDER — GABAPENTIN 300 MG/1
600 CAPSULE ORAL 3 TIMES DAILY
Status: DISCONTINUED | OUTPATIENT
Start: 2022-11-20 | End: 2022-11-21

## 2022-11-20 RX ORDER — ACETAMINOPHEN 650 MG/1
650 SUPPOSITORY RECTAL EVERY 6 HOURS PRN
Status: DISCONTINUED | OUTPATIENT
Start: 2022-11-20 | End: 2022-11-28 | Stop reason: HOSPADM

## 2022-11-20 RX ORDER — SODIUM CHLORIDE 0.9 % (FLUSH) 0.9 %
5-40 SYRINGE (ML) INJECTION EVERY 12 HOURS SCHEDULED
Status: DISCONTINUED | OUTPATIENT
Start: 2022-11-20 | End: 2022-11-28 | Stop reason: HOSPADM

## 2022-11-20 RX ORDER — OXYCODONE AND ACETAMINOPHEN 7.5; 325 MG/1; MG/1
1 TABLET ORAL 3 TIMES DAILY
Status: DISCONTINUED | OUTPATIENT
Start: 2022-11-20 | End: 2022-11-20

## 2022-11-20 RX ORDER — POTASSIUM CHLORIDE 7.45 MG/ML
10 INJECTION INTRAVENOUS PRN
Status: DISCONTINUED | OUTPATIENT
Start: 2022-11-20 | End: 2022-11-28 | Stop reason: HOSPADM

## 2022-11-20 RX ORDER — MAGNESIUM SULFATE IN WATER 40 MG/ML
2000 INJECTION, SOLUTION INTRAVENOUS PRN
Status: DISCONTINUED | OUTPATIENT
Start: 2022-11-20 | End: 2022-11-22

## 2022-11-20 RX ORDER — OXYCODONE AND ACETAMINOPHEN 10; 325 MG/1; MG/1
1 TABLET ORAL EVERY 6 HOURS PRN
Status: DISCONTINUED | OUTPATIENT
Start: 2022-11-20 | End: 2022-11-23

## 2022-11-20 RX ORDER — HYDROMORPHONE HYDROCHLORIDE 1 MG/ML
1 INJECTION, SOLUTION INTRAMUSCULAR; INTRAVENOUS; SUBCUTANEOUS ONCE
Status: COMPLETED | OUTPATIENT
Start: 2022-11-20 | End: 2022-11-20

## 2022-11-20 RX ORDER — SODIUM CHLORIDE 0.9 % (FLUSH) 0.9 %
5-40 SYRINGE (ML) INJECTION PRN
Status: DISCONTINUED | OUTPATIENT
Start: 2022-11-20 | End: 2022-11-28 | Stop reason: HOSPADM

## 2022-11-20 RX ORDER — SODIUM CHLORIDE 9 MG/ML
25 INJECTION, SOLUTION INTRAVENOUS PRN
Status: DISCONTINUED | OUTPATIENT
Start: 2022-11-20 | End: 2022-11-28 | Stop reason: HOSPADM

## 2022-11-20 RX ORDER — ORPHENADRINE CITRATE 30 MG/ML
60 INJECTION INTRAMUSCULAR; INTRAVENOUS ONCE
Status: COMPLETED | OUTPATIENT
Start: 2022-11-20 | End: 2022-11-20

## 2022-11-20 RX ORDER — OXYCODONE AND ACETAMINOPHEN 7.5; 325 MG/1; MG/1
1 TABLET ORAL 3 TIMES DAILY PRN
Status: DISCONTINUED | OUTPATIENT
Start: 2022-11-20 | End: 2022-11-20

## 2022-11-20 RX ORDER — FAMOTIDINE 20 MG/1
20 TABLET, FILM COATED ORAL 2 TIMES DAILY
Status: DISCONTINUED | OUTPATIENT
Start: 2022-11-20 | End: 2022-11-28 | Stop reason: HOSPADM

## 2022-11-20 RX ORDER — PROMETHAZINE HYDROCHLORIDE 12.5 MG/1
12.5 TABLET ORAL EVERY 6 HOURS PRN
Status: DISCONTINUED | OUTPATIENT
Start: 2022-11-20 | End: 2022-11-28 | Stop reason: HOSPADM

## 2022-11-20 RX ORDER — ACETAMINOPHEN 325 MG/1
650 TABLET ORAL EVERY 6 HOURS PRN
Status: DISCONTINUED | OUTPATIENT
Start: 2022-11-20 | End: 2022-11-28 | Stop reason: HOSPADM

## 2022-11-20 RX ORDER — MELOXICAM 7.5 MG/1
7.5 TABLET ORAL DAILY
Status: DISCONTINUED | OUTPATIENT
Start: 2022-11-21 | End: 2022-11-22

## 2022-11-20 RX ORDER — ONDANSETRON 2 MG/ML
4 INJECTION INTRAMUSCULAR; INTRAVENOUS EVERY 6 HOURS PRN
Status: DISCONTINUED | OUTPATIENT
Start: 2022-11-20 | End: 2022-11-28 | Stop reason: HOSPADM

## 2022-11-20 RX ORDER — POLYETHYLENE GLYCOL 3350 17 G/17G
17 POWDER, FOR SOLUTION ORAL DAILY PRN
Status: DISCONTINUED | OUTPATIENT
Start: 2022-11-20 | End: 2022-11-22 | Stop reason: SDUPTHER

## 2022-11-20 RX ORDER — TIZANIDINE 4 MG/1
4 TABLET ORAL EVERY 6 HOURS PRN
Status: DISCONTINUED | OUTPATIENT
Start: 2022-11-20 | End: 2022-11-28 | Stop reason: HOSPADM

## 2022-11-20 RX ORDER — OXYCODONE AND ACETAMINOPHEN 10; 325 MG/1; MG/1
1 TABLET ORAL EVERY 6 HOURS PRN
Status: ON HOLD | COMMUNITY
End: 2022-11-28 | Stop reason: HOSPADM

## 2022-11-20 RX ORDER — GABAPENTIN 400 MG/1
400 CAPSULE ORAL 3 TIMES DAILY
Status: DISCONTINUED | OUTPATIENT
Start: 2022-11-20 | End: 2022-11-20

## 2022-11-20 RX ADMIN — DEXAMETHASONE SODIUM PHOSPHATE 10 MG: 10 INJECTION, SOLUTION INTRAMUSCULAR; INTRAVENOUS at 11:16

## 2022-11-20 RX ADMIN — TIZANIDINE 4 MG: 4 TABLET ORAL at 19:40

## 2022-11-20 RX ADMIN — ORPHENADRINE CITRATE 60 MG: 30 INJECTION INTRAMUSCULAR; INTRAVENOUS at 11:14

## 2022-11-20 RX ADMIN — GABAPENTIN 600 MG: 300 CAPSULE ORAL at 23:53

## 2022-11-20 RX ADMIN — OXYCODONE AND ACETAMINOPHEN 1 TABLET: 10; 325 TABLET ORAL at 23:52

## 2022-11-20 RX ADMIN — SODIUM CHLORIDE, PRESERVATIVE FREE 10 ML: 5 INJECTION INTRAVENOUS at 14:47

## 2022-11-20 RX ADMIN — GABAPENTIN 400 MG: 400 CAPSULE ORAL at 14:46

## 2022-11-20 RX ADMIN — FAMOTIDINE 20 MG: 20 TABLET ORAL at 19:39

## 2022-11-20 RX ADMIN — OXYCODONE HYDROCHLORIDE AND ACETAMINOPHEN 1 TABLET: 7.5; 325 TABLET ORAL at 18:08

## 2022-11-20 RX ADMIN — SODIUM CHLORIDE, PRESERVATIVE FREE 10 ML: 5 INJECTION INTRAVENOUS at 19:40

## 2022-11-20 RX ADMIN — FAMOTIDINE 20 MG: 20 TABLET ORAL at 14:46

## 2022-11-20 RX ADMIN — HYDROMORPHONE HYDROCHLORIDE 1 MG: 1 INJECTION, SOLUTION INTRAMUSCULAR; INTRAVENOUS; SUBCUTANEOUS at 11:17

## 2022-11-20 ASSESSMENT — ENCOUNTER SYMPTOMS
CONSTIPATION: 0
SORE THROAT: 0
ABDOMINAL PAIN: 0
ALLERGIC/IMMUNOLOGIC NEGATIVE: 1
VOMITING: 1
DIARRHEA: 0
NAUSEA: 0
EYES NEGATIVE: 1
VOMITING: 0
ABDOMINAL DISTENTION: 1
CHEST TIGHTNESS: 0
SHORTNESS OF BREATH: 0
BACK PAIN: 0
RHINORRHEA: 0
COUGH: 0

## 2022-11-20 ASSESSMENT — PAIN SCALES - GENERAL
PAINLEVEL_OUTOF10: 9
PAINLEVEL_OUTOF10: 6

## 2022-11-20 ASSESSMENT — PAIN DESCRIPTION - ORIENTATION: ORIENTATION: LOWER

## 2022-11-20 ASSESSMENT — PAIN DESCRIPTION - LOCATION: LOCATION: LEG;BACK

## 2022-11-20 ASSESSMENT — PAIN DESCRIPTION - DESCRIPTORS: DESCRIPTORS: DISCOMFORT

## 2022-11-20 NOTE — ED PROVIDER NOTES
[facility]  eMERGENCY dEPARTMENT eNCOUnter      Pt Name: Mary Wells  MRN: [id number]  [place] 1965  Date of evaluation: 11/20/2022  Provider: Mary Beth Dos Santos MD    CHIEF COMPLAINT       Chief Complaint   Patient presents with    Spasms     Was told to come back if spasms were worsening, is set to have to have surgery on 11/29    Numbness     Patient states that it feels \"numb\" in his abdomen         HISTORY OF PRESENT ILLNESS   (Location/Symptom, Timing/Onset,Context/Setting, Quality, Duration, Modifying Factors, Severity)  Note limiting factors. Mary Wells is a 62 y.o. male who presents to the emergency department for worsening neurologic symptoms. Patient states he is having increased difficulty ambulating having spasms in his legs. Also complains of abdominal numbness. Patient had a full spine MRI emergency department on November 8 found to have cervical spine cord compression with multilevel neuroforaminal stenosis and degenerative changes in his thoracic and lumbar spine as well. He has dealt with chronic back pain for many years and is followed by pain management. He was seen by Dr. Jessica Brooks on the 14th and is scheduled for a C3-6 laminectomy with fusion on the 29th. Was told that if his symptoms worsen that he should come to the emergency department. He denies any fevers or chills. No bowel or bladder incontinence. HPI    NursingNotes were reviewed. REVIEW OF SYSTEMS    (2-9 systems for level 4, 10 or more for level 5)     Review of Systems   Constitutional:  Negative for chills and fever. HENT:  Negative for rhinorrhea and sore throat. Respiratory:  Negative for cough and shortness of breath. Cardiovascular:  Negative for chest pain and leg swelling. Gastrointestinal:  Negative for abdominal pain, diarrhea, nausea and vomiting. Genitourinary:  Negative for difficulty urinating, dysuria and frequency. Musculoskeletal:  Positive for gait problem.  Negative for back pain and neck pain. Neurological:  Positive for weakness and numbness. Negative for dizziness and headaches. All other systems reviewed and are negative. PAST MEDICALHISTORY   History reviewed. No pertinent past medical history. SURGICAL HISTORY     History reviewed. No pertinent surgical history. CURRENT MEDICATIONS     Current Discharge Medication List        CONTINUE these medications which have NOT CHANGED    Details   gabapentin (NEURONTIN) 400 MG capsule Take 400 mg by mouth 3 times daily. meloxicam (MOBIC) 7.5 MG tablet Take 7.5 mg by mouth daily      oxyCODONE-acetaminophen (PERCOCET) 7.5-325 MG per tablet Take 1 tablet by mouth 3 times daily. tiZANidine (ZANAFLEX) 4 MG tablet Take 4 mg by mouth every 6 hours as needed      methylPREDNISolone (MEDROL, ALIYAH,) 4 MG tablet Take by mouth. Qty: 1 kit, Refills: 0             ALLERGIES     Patient has no known allergies. FAMILY HISTORY     History reviewed. No pertinent family history. SOCIAL HISTORY       Social History     Socioeconomic History    Marital status: Legally      Spouse name: None    Number of children: None    Years of education: None    Highest education level: None   Tobacco Use    Smoking status: Every Day     Types: Cigarettes    Smokeless tobacco: Never   Substance and Sexual Activity    Alcohol use: Not Currently    Drug use: Not Currently       SCREENINGS    Leon Coma Scale  Eye Opening: Spontaneous  Best Verbal Response: Oriented  Best Motor Response: Obeys commands  Leon Coma Scale Score: 15        PHYSICAL EXAM    (up to 7 for level 4, 8 or more for level 5)     ED Triage Vitals [11/20/22 1042]   BP Temp Temp src Heart Rate Resp SpO2 Height Weight   -- 98 °F (36.7 °C) -- 86 18 93 % -- 165 lb (74.8 kg)       Physical Exam  Vitals reviewed. Constitutional:       Appearance: He is well-developed. He is ill-appearing. He is not diaphoretic.    HENT:      Head: Normocephalic and atraumatic. Nose: Nose normal.      Mouth/Throat:      Mouth: Mucous membranes are moist.   Eyes:      Conjunctiva/sclera: Conjunctivae normal.   Neck:      Trachea: No tracheal deviation. Cardiovascular:      Rate and Rhythm: Normal rate and regular rhythm. Pulses: Normal pulses. Heart sounds: Normal heart sounds. No murmur heard. Pulmonary:      Breath sounds: Normal breath sounds. No wheezing or rales. Abdominal:      Palpations: Abdomen is soft. Tenderness: There is no abdominal tenderness. Musculoskeletal:         General: Normal range of motion. Cervical back: Normal range of motion and neck supple. Bony tenderness present. Thoracic back: Bony tenderness present. Lumbar back: Bony tenderness present. Skin:     General: Skin is warm and dry. Neurological:      Mental Status: He is alert and oriented to person, place, and time. Comments: Decreased  strength bilaterally    Able to lift both legs off the bed and stand with assistance but off balance and has very antalgic type gait    Decreased sensation b/l hands as well as BLE    Abd wall dec sensation, reports normal sensation above nipple area       DIAGNOSTIC RESULTS           No orders to display           LABS:  Labs Reviewed   CBC WITH AUTO DIFFERENTIAL - Abnormal; Notable for the following components:       Result Value    RBC 4.58 (*)     MCHC 32.6 (*)     Neutrophils % 72.0 (*)     All other components within normal limits   COMPREHENSIVE METABOLIC PANEL - Abnormal; Notable for the following components:    CO2 30 (*)     Glucose 176 (*)     All other components within normal limits   COVID-19, RAPID   APTT   PROTIME-INR       All other labs were within normal range or not returned as of this dictation.     EMERGENCY DEPARTMENT COURSE and DIFFERENTIAL DIAGNOSIS/MDM:   Vitals:    Vitals:    11/20/22 1230 11/20/22 1245 11/20/22 1308 11/20/22 1345   BP: 123/72 120/75 130/75    Pulse:  78 76    Resp:  18 18    Temp:  98.2 °F (36.8 °C) 99.5 °F (37.5 °C)    TempSrc:   Temporal    SpO2: 94% 94% 95%    Weight:    165 lb (74.8 kg)   Height:    5' 7\" (1.702 m)       MDM    Known degenerative spine disease with neuroforaminal stensosis. Area of most concern is known c spine compression and plans for laminectomy with fusion c3-c6 by Dr. Joyce Trejo 11/29. Pt continues to decline each day he tells me. I saw him earlier this month and it does seem he has worsened and needs surgery sooner than planned. Discussed the case with Dr. Hemant Sheppard and he will eval.  Based on exam currently dont think needs emergent OR now unless has acute change while in hospital and likely should be ok until tomorrow. CONSULTS:  IP CONSULT TO NEUROSURGERY    PROCEDURES:  Unless otherwise noted below, none     Procedures    FINAL IMPRESSION      1. Cervical myelopathy (HCC)    2. Cervical spinal cord compression (HCC)    3. Chronic back pain, unspecified back location, unspecified back pain laterality          DISPOSITION/PLAN   DISPOSITION Decision To Admit 11/20/2022 02:15:36 PM      PATIENT REFERRED TO:  No follow-up provider specified.     DISCHARGE MEDICATIONS:  Current Discharge Medication List             (Please note that portions of this note were completed with a voice recognition program.  Efforts were made to edit thedictations but occasionally words are mis-transcribed.)    Rayna Mayorga MD (electronically signed)  Attending Emergency Physician        Molly Pineda MD  11/20/22 1697

## 2022-11-20 NOTE — ED NOTES
Report given to [facility] on 5th floor      [facility]  11/20/22 7259

## 2022-11-20 NOTE — PLAN OF CARE
Problem: Smoking cessation:  Goal: Ability to formulate a plan to maintain a tobacco-free life will be supported  Description: Ability to formulate a plan to maintain a tobacco-free life will be supported  Outcome: Progressing     Problem: Safety - Adult  Goal: Free from fall injury  Outcome: Progressing

## 2022-11-20 NOTE — H&P
[facility] - History & Physical      PCP: Miranda Olson DO    Date of Admission: 11/20/2022    Date of Service: 11/20/2022    Chief Complaint:  Spasms/Numbness    History Of Present Illness:   Cody Chauhan is a 62 y.o. male who presents to the emergency department for worsening neurologic symptoms. Patient states he is having increased difficulty ambulating having spasms in his legs. Also complains of abdominal numbness. Patient had a full spine MRI emergency department on November 8 found to have cervical spine cord compression with multilevel neuroforaminal stenosis and degenerative changes in his thoracic and lumbar spine as well. He has dealt with chronic back pain for many years and is followed by pain management. He was seen by Dr. Naya Segovia on the 14th and is scheduled for a C3-6 laminectomy with fusion on the 29th. Was told that if his symptoms worsen that he should come to the emergency department. He denies any fevers or chills. No bowel or bladder incontinence. Patient will be admitted to Hospitalist services for management. Patient Has surgery scheduled for 11/29, C3-6 Laminectomy with fusion. Neurosurgery to follow. Past Medical History:    History reviewed. No pertinent past medical history. Past Surgical History:    History reviewed. No pertinent surgical history. Home Medications:  Prior to Admission medications    Medication Sig Start Date End Date Taking? Authorizing Provider   gabapentin (NEURONTIN) 400 MG capsule Take 400 mg by mouth 3 times daily. 8/28/20   Historical Provider, MD   meloxicam (MOBIC) 7.5 MG tablet Take 7.5 mg by mouth daily 7/13/20   Historical Provider, MD   oxyCODONE-acetaminophen (PERCOCET) 7.5-325 MG per tablet Take 1 tablet by mouth 3 times daily.     Historical Provider, MD   tiZANidine (ZANAFLEX) 4 MG tablet Take 4 mg by mouth every 6 hours as needed    Historical Provider, MD   methylPREDNISolone (MEDROL, ALIYAH,) 4 MG tablet Take by mouth. 11/8/22   Jossue Dodson MD       Allergies:    Patient has no known allergies. Social History:    The patient currently lives home  Tobacco:   reports that he has been smoking cigarettes. He has never used smokeless tobacco.  Alcohol:   reports that he does not currently use alcohol. Illicit Drugs: denies    Family History:  History reviewed. No pertinent family history. Review of Systems:   Review of Systems   Constitutional:  Positive for activity change. Negative for appetite change, fatigue and fever. HENT: Negative. Eyes: Negative. Respiratory:  Negative for cough, chest tightness and shortness of breath. Cardiovascular:  Negative for chest pain, palpitations and leg swelling. Gastrointestinal:  Positive for abdominal distention and vomiting. Negative for constipation, diarrhea and nausea. Endocrine: Negative. Genitourinary: Negative. Skin: Negative. Allergic/Immunologic: Negative. Neurological:  Positive for weakness and numbness. Negative for dizziness and light-headedness. Hematological: Negative. Psychiatric/Behavioral:  Negative for agitation and confusion. The patient is not nervous/anxious. 14 point review of systems is negative except as specifically addressed above. Physical Examination:  /75   Pulse 76   Temp 99.5 °F (37.5 °C) (Temporal)   Resp 18   Wt 165 lb (74.8 kg)   SpO2 95%   BMI 25.84 kg/m²   Physical Exam  Vitals and nursing note reviewed. Constitutional:       General: He is not in acute distress. Appearance: Normal appearance. He is not ill-appearing. HENT:      Head: Normocephalic. Nose: Nose normal.      Mouth/Throat:      Mouth: Mucous membranes are moist.   Eyes:      Pupils: Pupils are equal, round, and reactive to light. Cardiovascular:      Rate and Rhythm: Normal rate and regular rhythm. Pulses: Normal pulses. Heart sounds: Normal heart sounds.    Pulmonary:      Effort: Pulmonary effort is normal.      Breath sounds: Normal breath sounds. No wheezing. Abdominal:      General: Bowel sounds are normal.      Palpations: Abdomen is soft. Tenderness: There is no abdominal tenderness. Musculoskeletal:      Cervical back: Normal range of motion. Right lower leg: No edema. Left lower leg: No edema. Skin:     General: Skin is warm and dry. Capillary Refill: Capillary refill takes less than 2 seconds. Neurological:      Mental Status: He is alert and oriented to person, place, and time. Motor: Weakness present. Psychiatric:         Mood and Affect: Mood normal.         Behavior: Behavior normal.         Thought Content: Thought content normal.        Diagnostic Data:  CBC:  Recent Labs     11/20/22  1045   WBC 7.7   HGB 14.0   HCT 42.9        BMP:  Recent Labs     11/20/22  1045      K 4.2      CO2 30*   BUN 18   CREATININE 0.6   CALCIUM 9.8     Recent Labs     11/20/22  1045   AST 26   ALT 34   BILITOT 0.5   ALKPHOS 72     Coag Panel:   Recent Labs     11/20/22  1045   INR 0.95   PROTIME 12.6   APTT 27.5     Cardiac Enzymes: No results for input(s): CKTOTAL, TROPONINI in the last 72 hours. ABGs:No results found for: PHART, PO2ART, TMT7BLN  Urinalysis:No results found for: NITRU, WBCUA, BACTERIA, RBCUA, BLOODU, SPECGRAV, GLUCOSEU  A1C: No results for input(s): LABA1C in the last 72 hours. ABG:No results for input(s): PHART, YKH8OLG, PO2ART, CKT4QVW, BEART, HGBAE, Y7VACXXO, CARBOXHGBART in the last 72 hours. No results found. Assessment/Plan:  Principal Problem:    Back spasm  Resolved Problems:    * No resolved hospital problems.  *     Back spasm   -Consult Neurosurgery   -Neuros Q4hr   -Vitals per unit policy   -Fall Precautions   -NPO till seen per Neurosurgery or at [address]:  CARYN Burk CNP, 11/20/2022 1:11 PM

## 2022-11-21 ENCOUNTER — APPOINTMENT (OUTPATIENT)
Dept: CT IMAGING | Age: 57
DRG: 472 | End: 2022-11-21
Payer: MEDICARE

## 2022-11-21 PROBLEM — Z72.0 TOBACCO USE: Status: ACTIVE | Noted: 2022-11-21

## 2022-11-21 LAB
ABO/RH: NORMAL
ANION GAP SERPL CALCULATED.3IONS-SCNC: 14 MMOL/L (ref 7–19)
ANTIBODY SCREEN: NORMAL
BASOPHILS ABSOLUTE: 0 K/UL (ref 0–0.2)
BASOPHILS RELATIVE PERCENT: 0.1 % (ref 0–1)
BUN BLDV-MCNC: 24 MG/DL (ref 6–20)
CALCIUM SERPL-MCNC: 9.3 MG/DL (ref 8.6–10)
CHLORIDE BLD-SCNC: 105 MMOL/L (ref 98–111)
CO2: 21 MMOL/L (ref 22–29)
CREAT SERPL-MCNC: 0.7 MG/DL (ref 0.5–1.2)
EOSINOPHILS ABSOLUTE: 0 K/UL (ref 0–0.6)
EOSINOPHILS RELATIVE PERCENT: 0 % (ref 0–5)
GFR SERPL CREATININE-BSD FRML MDRD: >60 ML/MIN/{1.73_M2}
GLUCOSE BLD-MCNC: 122 MG/DL (ref 74–109)
HCT VFR BLD CALC: 36.7 % (ref 42–52)
HEMOGLOBIN: 12.1 G/DL (ref 14–18)
IMMATURE GRANULOCYTES #: 0.1 K/UL
LYMPHOCYTES ABSOLUTE: 1 K/UL (ref 1.1–4.5)
LYMPHOCYTES RELATIVE PERCENT: 10.5 % (ref 20–40)
MCH RBC QN AUTO: 31.3 PG (ref 27–31)
MCHC RBC AUTO-ENTMCNC: 33 G/DL (ref 33–37)
MCV RBC AUTO: 95.1 FL (ref 80–94)
MONOCYTES ABSOLUTE: 0.7 K/UL (ref 0–0.9)
MONOCYTES RELATIVE PERCENT: 7.1 % (ref 0–10)
NEUTROPHILS ABSOLUTE: 7.9 K/UL (ref 1.5–7.5)
NEUTROPHILS RELATIVE PERCENT: 81.8 % (ref 50–65)
PDW BLD-RTO: 14.1 % (ref 11.5–14.5)
PLATELET # BLD: 172 K/UL (ref 130–400)
PMV BLD AUTO: 11.8 FL (ref 9.4–12.4)
POTASSIUM REFLEX MAGNESIUM: 4.4 MMOL/L (ref 3.5–5)
RBC # BLD: 3.86 M/UL (ref 4.7–6.1)
SODIUM BLD-SCNC: 140 MMOL/L (ref 136–145)
WBC # BLD: 9.6 K/UL (ref 4.8–10.8)

## 2022-11-21 PROCEDURE — 2580000003 HC RX 258: Performed by: NEUROLOGICAL SURGERY

## 2022-11-21 PROCEDURE — 6370000000 HC RX 637 (ALT 250 FOR IP): Performed by: HOSPITALIST

## 2022-11-21 PROCEDURE — 86900 BLOOD TYPING SEROLOGIC ABO: CPT

## 2022-11-21 PROCEDURE — 85025 COMPLETE CBC W/AUTO DIFF WBC: CPT

## 2022-11-21 PROCEDURE — 36415 COLL VENOUS BLD VENIPUNCTURE: CPT

## 2022-11-21 PROCEDURE — 86850 RBC ANTIBODY SCREEN: CPT

## 2022-11-21 PROCEDURE — 94760 N-INVAS EAR/PLS OXIMETRY 1: CPT

## 2022-11-21 PROCEDURE — 80048 BASIC METABOLIC PNL TOTAL CA: CPT

## 2022-11-21 PROCEDURE — 1210000000 HC MED SURG R&B

## 2022-11-21 PROCEDURE — 99221 1ST HOSP IP/OBS SF/LOW 40: CPT | Performed by: NEUROLOGICAL SURGERY

## 2022-11-21 PROCEDURE — 72125 CT NECK SPINE W/O DYE: CPT

## 2022-11-21 PROCEDURE — 6370000000 HC RX 637 (ALT 250 FOR IP): Performed by: NURSE PRACTITIONER

## 2022-11-21 PROCEDURE — 6370000000 HC RX 637 (ALT 250 FOR IP): Performed by: INTERNAL MEDICINE

## 2022-11-21 PROCEDURE — 6370000000 HC RX 637 (ALT 250 FOR IP)

## 2022-11-21 PROCEDURE — 2580000003 HC RX 258

## 2022-11-21 PROCEDURE — 86901 BLOOD TYPING SEROLOGIC RH(D): CPT

## 2022-11-21 RX ORDER — NICOTINE 21 MG/24HR
1 PATCH, TRANSDERMAL 24 HOURS TRANSDERMAL DAILY
Status: DISCONTINUED | OUTPATIENT
Start: 2022-11-21 | End: 2022-11-28 | Stop reason: HOSPADM

## 2022-11-21 RX ORDER — GABAPENTIN 600 MG/1
600 TABLET ORAL
Status: DISCONTINUED | OUTPATIENT
Start: 2022-11-21 | End: 2022-11-28 | Stop reason: HOSPADM

## 2022-11-21 RX ORDER — SODIUM CHLORIDE, SODIUM LACTATE, POTASSIUM CHLORIDE, CALCIUM CHLORIDE 600; 310; 30; 20 MG/100ML; MG/100ML; MG/100ML; MG/100ML
INJECTION, SOLUTION INTRAVENOUS CONTINUOUS
Status: DISCONTINUED | OUTPATIENT
Start: 2022-11-22 | End: 2022-11-23

## 2022-11-21 RX ADMIN — FAMOTIDINE 20 MG: 20 TABLET ORAL at 19:54

## 2022-11-21 RX ADMIN — GABAPENTIN 600 MG: 300 CAPSULE ORAL at 07:23

## 2022-11-21 RX ADMIN — GABAPENTIN 600 MG: 600 TABLET, FILM COATED ORAL at 23:20

## 2022-11-21 RX ADMIN — TIZANIDINE 4 MG: 4 TABLET ORAL at 23:20

## 2022-11-21 RX ADMIN — SODIUM CHLORIDE, PRESERVATIVE FREE 10 ML: 5 INJECTION INTRAVENOUS at 07:24

## 2022-11-21 RX ADMIN — SODIUM CHLORIDE, POTASSIUM CHLORIDE, SODIUM LACTATE AND CALCIUM CHLORIDE: 600; 310; 30; 20 INJECTION, SOLUTION INTRAVENOUS at 23:22

## 2022-11-21 RX ADMIN — GABAPENTIN 600 MG: 600 TABLET, FILM COATED ORAL at 19:54

## 2022-11-21 RX ADMIN — OXYCODONE AND ACETAMINOPHEN 1 TABLET: 10; 325 TABLET ORAL at 19:54

## 2022-11-21 RX ADMIN — FAMOTIDINE 20 MG: 20 TABLET ORAL at 07:24

## 2022-11-21 RX ADMIN — TIZANIDINE 4 MG: 4 TABLET ORAL at 06:48

## 2022-11-21 RX ADMIN — GABAPENTIN 600 MG: 600 TABLET, FILM COATED ORAL at 15:01

## 2022-11-21 RX ADMIN — MELOXICAM 7.5 MG: 7.5 TABLET ORAL at 07:24

## 2022-11-21 RX ADMIN — GABAPENTIN 600 MG: 600 TABLET, FILM COATED ORAL at 10:52

## 2022-11-21 RX ADMIN — OXYCODONE AND ACETAMINOPHEN 1 TABLET: 10; 325 TABLET ORAL at 13:31

## 2022-11-21 RX ADMIN — OXYCODONE AND ACETAMINOPHEN 1 TABLET: 10; 325 TABLET ORAL at 06:48

## 2022-11-21 ASSESSMENT — PAIN DESCRIPTION - LOCATION
LOCATION: BACK;NECK
LOCATION: BACK
LOCATION: BACK;LEG

## 2022-11-21 ASSESSMENT — PAIN SCALES - GENERAL
PAINLEVEL_OUTOF10: 0
PAINLEVEL_OUTOF10: 7
PAINLEVEL_OUTOF10: 0
PAINLEVEL_OUTOF10: 8

## 2022-11-21 ASSESSMENT — ENCOUNTER SYMPTOMS
CHEST TIGHTNESS: 0
TROUBLE SWALLOWING: 0
CONSTIPATION: 0
COLOR CHANGE: 0
SHORTNESS OF BREATH: 0
DIARRHEA: 0
ABDOMINAL PAIN: 0
SORE THROAT: 0
BACK PAIN: 1
NAUSEA: 0
VOMITING: 0

## 2022-11-21 ASSESSMENT — PAIN DESCRIPTION - DESCRIPTORS
DESCRIPTORS: ACHING
DESCRIPTORS: DISCOMFORT;ACHING;BURNING;CRAMPING
DESCRIPTORS: ACHING;STABBING;THROBBING

## 2022-11-21 ASSESSMENT — PAIN - FUNCTIONAL ASSESSMENT
PAIN_FUNCTIONAL_ASSESSMENT: PREVENTS OR INTERFERES SOME ACTIVE ACTIVITIES AND ADLS
PAIN_FUNCTIONAL_ASSESSMENT: PREVENTS OR INTERFERES SOME ACTIVE ACTIVITIES AND ADLS

## 2022-11-21 ASSESSMENT — PAIN DESCRIPTION - ORIENTATION
ORIENTATION: MID;UPPER
ORIENTATION: RIGHT;LOWER;LEFT

## 2022-11-21 NOTE — PROGRESS NOTES
Called [facility] pharmacy and clarified home med list with pharmacist, pharmacist stated patient takes percocet 10/325 mg po 1 tab q 6 hr prn and neurontin 600 mg po 1 tab Tid, called dr Ismael Dinh and updated Electronically signed by Ashlee Benson RN on 11/20/2022 at 11:08 PM

## 2022-11-21 NOTE — PROGRESS NOTES
NEUROSURGERY    Patient seen and examined, chart reviewed. Patient known to me from recent in-office evaluation with plan for surgery on 11/29. His myelopathic symptoms appear to be worsening. His gait and balance impairment is progressing and he has new numbness in his abdomen. Exam remains significant for bilateral Lyon's, numbness in the hands and ataxic gait. His wife is having difficulty caring for him at home. Given the progression of his symptoms, I will plan to proceed with surgery tomorrow, 11/22. I will obtain a CT scan of the cervical spine as a preoperative planning study. NPO after midnight.

## 2022-11-21 NOTE — PROGRESS NOTES
Called [facility] at this time to determine dosage and instructions on patients medications. Pt believed there to be a discrepancy between how frequent he was receiving his Neurontin dose. [place] [facility] at [facility] states the latest script filled 11/12/22 states 600mg take 5 times daily. MD [place] to be updated on med req.

## 2022-11-21 NOTE — CONSULTS
[facility]  Consult Note    CHIEF COMPLAINT: Progressive loss of sensation and ataxia    HISTORY OF PRESENT ILLNESS:      The patient is a 62 y.o. male who is a patient of Dr. Saundra Dewey who was seen by him on November 14, 2022. At that time, the patient complained of progressive ataxia and loss of fine motor skills of his bilateral upper extremities. Dr. Mandy Alaniz reviewed his imaging studies and recommended a posterior cervical decompression and fusion C3-C6. Patient was scheduled to undergo surgical intervention on 11/29/2022. He presented to the ER yesterday evening with progressive loss of the ability to walk and additional numbness in the abdominal area. His strength, bowel and urinary function remained stable. Neurosurgical team was asked to evaluate. History reviewed. No pertinent past medical history. History reviewed. No pertinent surgical history.      Medications    Current Facility-Administered Medications:     meloxicam (MOBIC) tablet 7.5 mg, 7.5 mg, Oral, Daily, Min L Day, APRN - CNP, 7.5 mg at 11/21/22 0724    tiZANidine (ZANAFLEX) tablet 4 mg, 4 mg, Oral, Q6H PRN, Min L Day, APRN - CNP, 4 mg at 11/21/22 0648    sodium chloride flush 0.9 % injection 5-40 mL, 5-40 mL, IntraVENous, 2 times per day, Min L Day, APRN - CNP, 10 mL at 11/21/22 0724    sodium chloride flush 0.9 % injection 5-40 mL, 5-40 mL, IntraVENous, PRN, Min L Day, APRN - CNP    0.9 % sodium chloride infusion, 25 mL, IntraVENous, PRN, Min L Day, APRN - CNP    potassium chloride 10 mEq/100 mL IVPB (Peripheral Line), 10 mEq, IntraVENous, PRN, Min L Day, APRN - CNP    magnesium sulfate 2000 mg in 50 mL IVPB premix, 2,000 mg, IntraVENous, PRN, Min L Day, APRN - CNP    promethazine (PHENERGAN) tablet 12.5 mg, 12.5 mg, Oral, Q6H PRN **OR** ondansetron (ZOFRAN) injection 4 mg, 4 mg, IntraVENous, Q6H PRN, Min L Day, APRN - CNP    polyethylene glycol (GLYCOLAX) packet 17 g, 17 g, Oral, Daily PRN, Min L Day, APRN - CNP    famotidine (PEPCID) tablet 20 mg, 20 mg, Oral, BID, Min L Day, APRN - CNP, 20 mg at 11/21/22 8654    acetaminophen (TYLENOL) tablet 650 mg, 650 mg, Oral, Q6H PRN **OR** acetaminophen (TYLENOL) suppository 650 mg, 650 mg, Rectal, Q6H PRN, Min L Day, APRN - CNP    oxyCODONE-acetaminophen (PERCOCET)  MG per tablet 1 tablet, 1 tablet, Oral, Q6H PRN, Cruz Fontanez MD, 1 tablet at 11/21/22 0648    gabapentin (NEURONTIN) capsule 600 mg, 600 mg, Oral, TID, Cruz Fontanez MD, 600 mg at 11/21/22 3987  Patient has no known allergies. Social History  Social History     Tobacco Use   Smoking Status Every Day    Types: Cigarettes   Smokeless Tobacco Never     Social History     Substance and Sexual Activity   Alcohol Use Not Currently         History reviewed. No pertinent family history. REVIEW OF SYSTEMS:  Constitutional: No fevers No chills  Neck:No stiffness  Respiratory: No shortness of breath  Cardiovascular: No chest pain No palpitations  Gastrointestinal: No abdominal pain    Genitourinary: No Dysuria  Neurological: No headache, no confusion  All other systems are reviewed and are negative. PHYSICAL EXAM:  Vitals:    11/21/22 0145   BP: 105/70   Pulse: 86   Resp: 18   Temp: 99.1 °F (37.3 °C)   SpO2: 93%       Constitutional: The patient appears well-developed and well-nourished. Eyes - conjunctiva normal.  Conjugate Gaze  Ear, nose, throat - No scars, masses, or lesions over external nose or ears, no atrophy of tongue  Neck-symmetric, no masses noted, no jugular vein distension  Respiration- chest wall appears symmetric, good expansion, normal effort without use of accessory muscles  Musculoskeletal - no significant wasting of muscles noted, no bony deformities  Extremities-no clubbing, cyanosis or edema  Skin - warm, dry, and intact. No rash, erythema, or pallor.   Psychiatric - mood, affect, and behavior appear normal.     Neurologic Examination  Awake, Alert and oriented x 4  Normal speech pattern, following commands    Motor:  RIGHT: hand grasp 5/5    finger extension 5/5    bicep 5/5    triceps 5/5    deltoid 5/5      iliopsoas 5/5    knee flexor 5/5    knee extension 5/5    EHL/dorsiflexion 5/5    plantar flexion 5/5    LEFT:   hand grasp 5/5    finger extension 5/5    bicep 5/5    triceps 5/5    deltoid 5/5      iliopsoas 5/5    knee flexor 5/5    knee extension 5/5    EHL/dorsiflexion 5/5    plantar flexion 5/5    Decreased pinprick sensation throughout bilateral hands. Hyperreflexia noted in the upper and lower extremities. Bilateral Sheri signs. DATA:  Nursing/pcp notes, imaging,labs and vitals reviewed. PT,OT and/or speech notes reviewed    Lab Results   Component Value Date    WBC 9.6 11/21/2022    HGB 12.1 (L) 11/21/2022    HCT 36.7 (L) 11/21/2022    MCV 95.1 (H) 11/21/2022     11/21/2022     Lab Results   Component Value Date     11/21/2022    K 4.4 11/21/2022     11/21/2022    CO2 21 (L) 11/21/2022    BUN 24 (H) 11/21/2022    CREATININE 0.7 11/21/2022    GLUCOSE 122 (H) 11/21/2022    CALCIUM 9.3 11/21/2022    PROT 8.0 11/20/2022    LABALBU 4.2 11/20/2022    BILITOT 0.5 11/20/2022    ALKPHOS 72 11/20/2022    AST 26 11/20/2022    ALT 34 11/20/2022    LABGLOM >60 11/21/2022     Lab Results   Component Value Date    INR 0.95 11/20/2022    PROTIME 12.6 11/20/2022         IMPRESSION  66-year-old male with progressive ataxia, loss of sensation and fine motor skills of the hands with upper motor neuron signs upon examination overall consistent with a cervical myelopathy. MRI of the cervical spine completed as an outpatient in the recent past revealed severe spinal cord compression with intrinsic spinal cord signal change in the C3, C4 region. Dr. Davon Drew. Karissa Reed planned a decompressive cervical laminectomy C3-C6 for later this month, however, the patient's symptoms have significantly progressed.     RECOMMENDATIONS:    I agree with  Burke Sanon and feel this patient needs surgical intervention. Due to the progression of symptoms and severe findings on the MRI I feel that this should be addressed during his hospitalization.   I reached out to Dr. Burke Sanon that he will be able to evaluate and formulate a final plan tomorrow AM.      Lizzie Godoy, DO

## 2022-11-21 NOTE — PROGRESS NOTES
[facility] Hospitalists      Patient:  Gwen Barlow  YOB: 1965  Date of Service: 11/21/2022  MRN: [id number]   Acct: [de-identified]   Primary Care Physician: Savanah Baptiste DO  Advance Directive: Full Code  Admit Date: 11/20/2022       Hospital Day: 1  Portions of this note have been copied forward, however, changed to reflect the most current clinical status of this patient. CHIEF COMPLAINT back spasms / numbness    SUBJECTIVE:  Patient is very anxious today. Reports continued GREG extremity numbness. Reports back spasms if he \"straightens out. \" Denies n/v/d. Denies chest pain and shortness of breath. CUMULATIVE HOSPITAL COURSE:  Patient is a 49-year-old male with past medical history of tobacco abuse and cervical spinal cord compression who presented to [facility] ED with complaints of worsening neurological symptoms. Patient reported being seen in the outpatient setting by neurosurgery with recommendation for a C3-6 laminectomy with fusion scheduled for 11/29/2022. Patient states he was told to go to the ED for any worsening symptoms. Patient reported having worsening bilateral upper extremity weakness and numbness. Patient also reported having numbness while of the abdomen. Patient reports having more difficulty ambulating. Patient denies issues with bowel and bladder control. Patient was admitted to the hospitalist service with neurosurgery consultation. Due to progression of symptoms neurosurgery recommends proceeding with surgery tomorrow, 11/22/2022. Review of Systems:   Review of Systems   Constitutional:  Negative for appetite change, chills, fatigue and fever. HENT:  Negative for sore throat and trouble swallowing. Respiratory:  Negative for chest tightness and shortness of breath. Cardiovascular:  Negative for chest pain, palpitations and leg swelling. Gastrointestinal:  Negative for abdominal pain, constipation, diarrhea, nausea and vomiting.    Genitourinary:  Negative for difficulty urinating, dysuria, flank pain, frequency and urgency. Musculoskeletal:  Positive for back pain. Negative for neck pain. Back spasms   Skin:  Negative for color change and wound. Neurological:  Positive for weakness and numbness. Negative for headaches. Psychiatric/Behavioral:  Negative for sleep disturbance. The patient is nervous/anxious. 14 point review of systems is negative except as specifically addressed above. Objective:   VITALS:  /89   Pulse 71   Temp 98.4 °F (36.9 °C) (Temporal)   Resp 18   Ht 5' 7\" (1.702 m)   Wt 165 lb (74.8 kg)   SpO2 95%   BMI 25.84 kg/m²   24HR INTAKE/OUTPUT:    Intake/Output Summary (Last 24 hours) at 11/21/2022 1359  Last data filed at 11/21/2022 1015  Gross per 24 hour   Intake 600 ml   Output --   Net 600 ml       Physical Exam  Vitals reviewed. Constitutional:       Appearance: He is not ill-appearing. HENT:      Mouth/Throat:      Mouth: Mucous membranes are moist.      Pharynx: Oropharynx is clear. Eyes:      Pupils: Pupils are equal, round, and reactive to light. Cardiovascular:      Rate and Rhythm: Normal rate and regular rhythm. Pulses: Normal pulses. Heart sounds: Normal heart sounds. Pulmonary:      Effort: Pulmonary effort is normal.      Breath sounds: Normal breath sounds. Abdominal:      General: Abdomen is flat. Bowel sounds are normal. There is no distension. Palpations: Abdomen is soft. Tenderness: There is no abdominal tenderness. There is no guarding. Musculoskeletal:      Cervical back: Neck supple. No tenderness. Right lower leg: No edema. Left lower leg: No edema. Skin:     General: Skin is warm and dry. Capillary Refill: Capillary refill takes less than 2 seconds. Neurological:      Mental Status: He is alert and oriented to person, place, and time. Sensory: Sensory deficit present. Motor: Weakness present.       Gait: Gait abnormal. Medications:      [START ON 11/22/2022] lactated ringers      sodium chloride        nicotine  1 patch TransDERmal Daily    gabapentin  600 mg Oral 5x Daily    [Held by provider] meloxicam  7.5 mg Oral Daily    sodium chloride flush  5-40 mL IntraVENous 2 times per day    famotidine  20 mg Oral BID     tiZANidine, sodium chloride flush, sodium chloride, potassium chloride, magnesium sulfate, promethazine **OR** ondansetron, polyethylene glycol, acetaminophen **OR** acetaminophen, oxyCODONE-acetaminophen  ADULT DIET; Regular  Diet NPO     Lab and other Data:     Recent Labs     11/20/22  1045 11/21/22  0143   WBC 7.7 9.6   HGB 14.0 12.1*    172     Recent Labs     11/20/22  1045 11/21/22  0143    140   K 4.2 4.4    105   CO2 30* 21*   BUN 18 24*   CREATININE 0.6 0.7   GLUCOSE 176* 122*     Recent Labs     11/20/22  1045   AST 26   ALT 34   BILITOT 0.5   ALKPHOS 72     Troponin T: No results for input(s): TROPONINI in the last 72 hours. Pro-BNP: No results for input(s): BNP in the last 72 hours. INR:   Recent Labs     11/20/22  1045   INR 0.95     UA:No results for input(s): NITRITE, COLORU, PHUR, LABCAST, WBCUA, RBCUA, MUCUS, TRICHOMONAS, YEAST, BACTERIA, CLARITYU, SPECGRAV, LEUKOCYTESUR, UROBILINOGEN, BILIRUBINUR, BLOODU, GLUCOSEU, AMORPHOUS in the last 72 hours. Invalid input(s): Stella Senters  A1C: No results for input(s): LABA1C in the last 72 hours. ABG:No results for input(s): PHART, KRD0NZO, PO2ART, SNA0GZF, BEART, HGBAE, B5PQUAUR, CARBOXHGBART in the last 72 hours. RAD:   No results found.        Assessment/Plan   Principal Problem:    Back spasm / Cervical myelopathy (HCC) / Cervical spinal cord compression ([facility] [address]   -neurosurgery on board    -plan for C3-6 posterior cervical decompression and fusion 11/22/22   -NPO after midnight   -continue current gabapentin and percocet as prescribed by pain management   -PT/OT post-op when ok with neurosurgery   -fall precautions   -bed alarm   -neurovascular checks    Active Problems:    Spondylolisthesis of lumbosacral region     -noted, continue current pain management regimen        Tobacco use   -nicoderm patch   -smoking cessation education      DVT Prophylaxis:CARYN Calvillo - CNP, 11/21/2022 1:59 PM

## 2022-11-22 ENCOUNTER — APPOINTMENT (OUTPATIENT)
Dept: CT IMAGING | Age: 57
DRG: 472 | End: 2022-11-22
Payer: MEDICARE

## 2022-11-22 ENCOUNTER — ANESTHESIA (OUTPATIENT)
Dept: OPERATING ROOM | Age: 57
DRG: 472 | End: 2022-11-22
Payer: MEDICARE

## 2022-11-22 ENCOUNTER — ANESTHESIA EVENT (OUTPATIENT)
Dept: OPERATING ROOM | Age: 57
DRG: 472 | End: 2022-11-22
Payer: MEDICARE

## 2022-11-22 ENCOUNTER — APPOINTMENT (OUTPATIENT)
Dept: GENERAL RADIOLOGY | Age: 57
DRG: 472 | End: 2022-11-22
Payer: MEDICARE

## 2022-11-22 LAB
ANION GAP SERPL CALCULATED.3IONS-SCNC: 12 MMOL/L (ref 7–19)
BASOPHILS ABSOLUTE: 0 K/UL (ref 0–0.2)
BASOPHILS RELATIVE PERCENT: 0.5 % (ref 0–1)
BUN BLDV-MCNC: 26 MG/DL (ref 6–20)
CALCIUM SERPL-MCNC: 8.9 MG/DL (ref 8.6–10)
CHLORIDE BLD-SCNC: 106 MMOL/L (ref 98–111)
CO2: 23 MMOL/L (ref 22–29)
CREAT SERPL-MCNC: 0.7 MG/DL (ref 0.5–1.2)
EOSINOPHILS ABSOLUTE: 0.1 K/UL (ref 0–0.6)
EOSINOPHILS RELATIVE PERCENT: 0.7 % (ref 0–5)
GFR SERPL CREATININE-BSD FRML MDRD: >60 ML/MIN/{1.73_M2}
GLUCOSE BLD-MCNC: 147 MG/DL (ref 74–109)
HCT VFR BLD CALC: 37.1 % (ref 42–52)
HEMOGLOBIN: 11.9 G/DL (ref 14–18)
IMMATURE GRANULOCYTES #: 0 K/UL
LYMPHOCYTES ABSOLUTE: 3.1 K/UL (ref 1.1–4.5)
LYMPHOCYTES RELATIVE PERCENT: 42 % (ref 20–40)
MCH RBC QN AUTO: 30.8 PG (ref 27–31)
MCHC RBC AUTO-ENTMCNC: 32.1 G/DL (ref 33–37)
MCV RBC AUTO: 96.1 FL (ref 80–94)
MONOCYTES ABSOLUTE: 0.5 K/UL (ref 0–0.9)
MONOCYTES RELATIVE PERCENT: 6.3 % (ref 0–10)
NEUTROPHILS ABSOLUTE: 3.7 K/UL (ref 1.5–7.5)
NEUTROPHILS RELATIVE PERCENT: 50.2 % (ref 50–65)
PDW BLD-RTO: 14.6 % (ref 11.5–14.5)
PLATELET # BLD: 167 K/UL (ref 130–400)
PMV BLD AUTO: 11.4 FL (ref 9.4–12.4)
POTASSIUM REFLEX MAGNESIUM: 3.8 MMOL/L (ref 3.5–5)
RBC # BLD: 3.86 M/UL (ref 4.7–6.1)
SODIUM BLD-SCNC: 141 MMOL/L (ref 136–145)
WBC # BLD: 7.4 K/UL (ref 4.8–10.8)

## 2022-11-22 PROCEDURE — C9290 INJ, BUPIVACAINE LIPOSOME: HCPCS | Performed by: NEUROLOGICAL SURGERY

## 2022-11-22 PROCEDURE — 3600000014 HC SURGERY LEVEL 4 ADDTL 15MIN: Performed by: NEUROLOGICAL SURGERY

## 2022-11-22 PROCEDURE — 6370000000 HC RX 637 (ALT 250 FOR IP): Performed by: NEUROLOGICAL SURGERY

## 2022-11-22 PROCEDURE — 3209999900 FLUORO FOR SURGICAL PROCEDURES

## 2022-11-22 PROCEDURE — 22614 ARTHRD PST TQ 1NTRSPC EA ADD: CPT | Performed by: NEUROLOGICAL SURGERY

## 2022-11-22 PROCEDURE — 72040 X-RAY EXAM NECK SPINE 2-3 VW: CPT

## 2022-11-22 PROCEDURE — 2580000003 HC RX 258: Performed by: ANESTHESIOLOGY

## 2022-11-22 PROCEDURE — 20930 SP BONE ALGRFT MORSEL ADD-ON: CPT | Performed by: NEUROLOGICAL SURGERY

## 2022-11-22 PROCEDURE — 3700000000 HC ANESTHESIA ATTENDED CARE: Performed by: NEUROLOGICAL SURGERY

## 2022-11-22 PROCEDURE — 7100000001 HC PACU RECOVERY - ADDTL 15 MIN: Performed by: NEUROLOGICAL SURGERY

## 2022-11-22 PROCEDURE — 00NW0ZZ RELEASE CERVICAL SPINAL CORD, OPEN APPROACH: ICD-10-PCS | Performed by: NEUROLOGICAL SURGERY

## 2022-11-22 PROCEDURE — L0120 CERV FLEX N/ADJ FOAM PRE OTS: HCPCS | Performed by: NEUROLOGICAL SURGERY

## 2022-11-22 PROCEDURE — 72125 CT NECK SPINE W/O DYE: CPT

## 2022-11-22 PROCEDURE — 2500000003 HC RX 250 WO HCPCS: Performed by: NEUROLOGICAL SURGERY

## 2022-11-22 PROCEDURE — 6360000002 HC RX W HCPCS: Performed by: NEUROLOGICAL SURGERY

## 2022-11-22 PROCEDURE — 63048 LAM FACETEC &FORAMOT EA ADDL: CPT | Performed by: NEUROLOGICAL SURGERY

## 2022-11-22 PROCEDURE — 2580000003 HC RX 258

## 2022-11-22 PROCEDURE — C1713 ANCHOR/SCREW BN/BN,TIS/BN: HCPCS | Performed by: NEUROLOGICAL SURGERY

## 2022-11-22 PROCEDURE — 2700000000 HC OXYGEN THERAPY PER DAY

## 2022-11-22 PROCEDURE — 01N10ZZ RELEASE CERVICAL NERVE, OPEN APPROACH: ICD-10-PCS | Performed by: NEUROLOGICAL SURGERY

## 2022-11-22 PROCEDURE — 2500000003 HC RX 250 WO HCPCS

## 2022-11-22 PROCEDURE — 6370000000 HC RX 637 (ALT 250 FOR IP): Performed by: INTERNAL MEDICINE

## 2022-11-22 PROCEDURE — 20936 SP BONE AGRFT LOCAL ADD-ON: CPT | Performed by: NEUROLOGICAL SURGERY

## 2022-11-22 PROCEDURE — 4A11X4G MONITORING OF PERIPHERAL NERVOUS ELECTRICAL ACTIVITY, INTRAOPERATIVE, EXTERNAL APPROACH: ICD-10-PCS | Performed by: NEUROLOGICAL SURGERY

## 2022-11-22 PROCEDURE — C9359 IMPLNT,BON VOID FILLER-PUTTY: HCPCS | Performed by: NEUROLOGICAL SURGERY

## 2022-11-22 PROCEDURE — 2720000010 HC SURG SUPPLY STERILE: Performed by: NEUROLOGICAL SURGERY

## 2022-11-22 PROCEDURE — 6370000000 HC RX 637 (ALT 250 FOR IP)

## 2022-11-22 PROCEDURE — 7100000000 HC PACU RECOVERY - FIRST 15 MIN: Performed by: NEUROLOGICAL SURGERY

## 2022-11-22 PROCEDURE — 72040 X-RAY EXAM NECK SPINE 2-3 VW: CPT | Performed by: RADIOLOGY

## 2022-11-22 PROCEDURE — 99233 SBSQ HOSP IP/OBS HIGH 50: CPT | Performed by: NEUROLOGICAL SURGERY

## 2022-11-22 PROCEDURE — 63045 LAM FACETEC & FORAMOT CRV: CPT | Performed by: NEUROLOGICAL SURGERY

## 2022-11-22 PROCEDURE — 80048 BASIC METABOLIC PNL TOTAL CA: CPT

## 2022-11-22 PROCEDURE — 2580000003 HC RX 258: Performed by: NEUROLOGICAL SURGERY

## 2022-11-22 PROCEDURE — 22842 INSERT SPINE FIXATION DEVICE: CPT | Performed by: NEUROLOGICAL SURGERY

## 2022-11-22 PROCEDURE — 3600000004 HC SURGERY LEVEL 4 BASE: Performed by: NEUROLOGICAL SURGERY

## 2022-11-22 PROCEDURE — 0RG2071 FUSION OF 2 OR MORE CERVICAL VERTEBRAL JOINTS WITH AUTOLOGOUS TISSUE SUBSTITUTE, POSTERIOR APPROACH, POSTERIOR COLUMN, OPEN APPROACH: ICD-10-PCS | Performed by: NEUROLOGICAL SURGERY

## 2022-11-22 PROCEDURE — 6360000002 HC RX W HCPCS

## 2022-11-22 PROCEDURE — 85025 COMPLETE CBC W/AUTO DIFF WBC: CPT

## 2022-11-22 PROCEDURE — 22600 ARTHRD PST TQ 1NTRSPC CRV: CPT | Performed by: NEUROLOGICAL SURGERY

## 2022-11-22 PROCEDURE — 6370000000 HC RX 637 (ALT 250 FOR IP): Performed by: HOSPITALIST

## 2022-11-22 PROCEDURE — 3700000001 HC ADD 15 MINUTES (ANESTHESIA): Performed by: NEUROLOGICAL SURGERY

## 2022-11-22 PROCEDURE — 1210000000 HC MED SURG R&B

## 2022-11-22 PROCEDURE — 94760 N-INVAS EAR/PLS OXIMETRY 1: CPT

## 2022-11-22 PROCEDURE — 36415 COLL VENOUS BLD VENIPUNCTURE: CPT

## 2022-11-22 PROCEDURE — 6370000000 HC RX 637 (ALT 250 FOR IP): Performed by: NURSE PRACTITIONER

## 2022-11-22 PROCEDURE — 2709999900 HC NON-CHARGEABLE SUPPLY: Performed by: NEUROLOGICAL SURGERY

## 2022-11-22 DEVICE — ROD 3603750 PRE-CUT 3.5MM X 50MM
Type: IMPLANTABLE DEVICE | Site: NECK | Status: FUNCTIONAL
Brand: INFINITY™ OCCIPITOCERVICAL UPPER THORACIC SYSTEM

## 2022-11-22 DEVICE — GRAFT BONE SUB 3ML DEMIN BONE MTRX PUTTY GRFTON: Type: IMPLANTABLE DEVICE | Site: NECK | Status: FUNCTIONAL

## 2022-11-22 RX ORDER — HYDROMORPHONE HYDROCHLORIDE 1 MG/ML
0.25 INJECTION, SOLUTION INTRAMUSCULAR; INTRAVENOUS; SUBCUTANEOUS EVERY 5 MIN PRN
Status: DISCONTINUED | OUTPATIENT
Start: 2022-11-22 | End: 2022-11-22 | Stop reason: HOSPADM

## 2022-11-22 RX ORDER — SODIUM CHLORIDE 0.9 % (FLUSH) 0.9 %
5-40 SYRINGE (ML) INJECTION EVERY 12 HOURS SCHEDULED
Status: DISCONTINUED | OUTPATIENT
Start: 2022-11-22 | End: 2022-11-22 | Stop reason: HOSPADM

## 2022-11-22 RX ORDER — LIDOCAINE HYDROCHLORIDE 20 MG/ML
INJECTION, SOLUTION EPIDURAL; INFILTRATION; INTRACAUDAL; PERINEURAL PRN
Status: DISCONTINUED | OUTPATIENT
Start: 2022-11-22 | End: 2022-11-22 | Stop reason: HOSPADM

## 2022-11-22 RX ORDER — HYDROMORPHONE HYDROCHLORIDE 1 MG/ML
0.5 INJECTION, SOLUTION INTRAMUSCULAR; INTRAVENOUS; SUBCUTANEOUS EVERY 5 MIN PRN
Status: DISCONTINUED | OUTPATIENT
Start: 2022-11-22 | End: 2022-11-22 | Stop reason: HOSPADM

## 2022-11-22 RX ORDER — POLYETHYLENE GLYCOL 3350 17 G/17G
17 POWDER, FOR SOLUTION ORAL DAILY PRN
Status: DISCONTINUED | OUTPATIENT
Start: 2022-11-22 | End: 2022-11-23

## 2022-11-22 RX ORDER — PROPOFOL 10 MG/ML
INJECTION, EMULSION INTRAVENOUS CONTINUOUS PRN
Status: DISCONTINUED | OUTPATIENT
Start: 2022-11-22 | End: 2022-11-22 | Stop reason: SDUPTHER

## 2022-11-22 RX ORDER — FENTANYL CITRATE 50 UG/ML
INJECTION, SOLUTION INTRAMUSCULAR; INTRAVENOUS PRN
Status: DISCONTINUED | OUTPATIENT
Start: 2022-11-22 | End: 2022-11-22 | Stop reason: SDUPTHER

## 2022-11-22 RX ORDER — LIDOCAINE HYDROCHLORIDE 10 MG/ML
INJECTION, SOLUTION EPIDURAL; INFILTRATION; INTRACAUDAL; PERINEURAL PRN
Status: DISCONTINUED | OUTPATIENT
Start: 2022-11-22 | End: 2022-11-22 | Stop reason: SDUPTHER

## 2022-11-22 RX ORDER — BISACODYL 10 MG
10 SUPPOSITORY, RECTAL RECTAL DAILY PRN
Status: DISCONTINUED | OUTPATIENT
Start: 2022-11-22 | End: 2022-11-23

## 2022-11-22 RX ORDER — SUCCINYLCHOLINE CHLORIDE 20 MG/ML
INJECTION INTRAMUSCULAR; INTRAVENOUS PRN
Status: DISCONTINUED | OUTPATIENT
Start: 2022-11-22 | End: 2022-11-22 | Stop reason: SDUPTHER

## 2022-11-22 RX ORDER — SODIUM CHLORIDE, SODIUM LACTATE, POTASSIUM CHLORIDE, CALCIUM CHLORIDE 600; 310; 30; 20 MG/100ML; MG/100ML; MG/100ML; MG/100ML
INJECTION, SOLUTION INTRAVENOUS CONTINUOUS
Status: DISCONTINUED | OUTPATIENT
Start: 2022-11-22 | End: 2022-11-22 | Stop reason: HOSPADM

## 2022-11-22 RX ORDER — SODIUM CHLORIDE 9 MG/ML
INJECTION, SOLUTION INTRAVENOUS PRN
Status: DISCONTINUED | OUTPATIENT
Start: 2022-11-22 | End: 2022-11-22 | Stop reason: HOSPADM

## 2022-11-22 RX ORDER — EPHEDRINE SULFATE 50 MG/ML
INJECTION, SOLUTION INTRAVENOUS PRN
Status: DISCONTINUED | OUTPATIENT
Start: 2022-11-22 | End: 2022-11-22 | Stop reason: SDUPTHER

## 2022-11-22 RX ORDER — PROPOFOL 10 MG/ML
INJECTION, EMULSION INTRAVENOUS PRN
Status: DISCONTINUED | OUTPATIENT
Start: 2022-11-22 | End: 2022-11-22 | Stop reason: SDUPTHER

## 2022-11-22 RX ORDER — DEXAMETHASONE SODIUM PHOSPHATE 10 MG/ML
INJECTION, SOLUTION INTRAMUSCULAR; INTRAVENOUS PRN
Status: DISCONTINUED | OUTPATIENT
Start: 2022-11-22 | End: 2022-11-22 | Stop reason: SDUPTHER

## 2022-11-22 RX ORDER — BUPIVACAINE HYDROCHLORIDE AND EPINEPHRINE 2.5; 5 MG/ML; UG/ML
INJECTION, SOLUTION INFILTRATION; PERINEURAL PRN
Status: DISCONTINUED | OUTPATIENT
Start: 2022-11-22 | End: 2022-11-22 | Stop reason: HOSPADM

## 2022-11-22 RX ORDER — DEXMEDETOMIDINE HYDROCHLORIDE 100 UG/ML
INJECTION, SOLUTION INTRAVENOUS PRN
Status: DISCONTINUED | OUTPATIENT
Start: 2022-11-22 | End: 2022-11-22 | Stop reason: SDUPTHER

## 2022-11-22 RX ORDER — ONDANSETRON 2 MG/ML
INJECTION INTRAMUSCULAR; INTRAVENOUS PRN
Status: DISCONTINUED | OUTPATIENT
Start: 2022-11-22 | End: 2022-11-22 | Stop reason: SDUPTHER

## 2022-11-22 RX ORDER — SENNA AND DOCUSATE SODIUM 50; 8.6 MG/1; MG/1
1 TABLET, FILM COATED ORAL 2 TIMES DAILY
Status: DISCONTINUED | OUTPATIENT
Start: 2022-11-22 | End: 2022-11-23

## 2022-11-22 RX ORDER — SODIUM CHLORIDE 0.9 % (FLUSH) 0.9 %
5-40 SYRINGE (ML) INJECTION PRN
Status: DISCONTINUED | OUTPATIENT
Start: 2022-11-22 | End: 2022-11-22 | Stop reason: HOSPADM

## 2022-11-22 RX ORDER — SODIUM CHLORIDE, SODIUM LACTATE, POTASSIUM CHLORIDE, CALCIUM CHLORIDE 600; 310; 30; 20 MG/100ML; MG/100ML; MG/100ML; MG/100ML
INJECTION, SOLUTION INTRAVENOUS CONTINUOUS PRN
Status: DISCONTINUED | OUTPATIENT
Start: 2022-11-22 | End: 2022-11-22 | Stop reason: SDUPTHER

## 2022-11-22 RX ORDER — DIPHENHYDRAMINE HYDROCHLORIDE 50 MG/ML
12.5 INJECTION INTRAMUSCULAR; INTRAVENOUS
Status: DISCONTINUED | OUTPATIENT
Start: 2022-11-22 | End: 2022-11-22 | Stop reason: HOSPADM

## 2022-11-22 RX ORDER — METOCLOPRAMIDE HYDROCHLORIDE 5 MG/ML
10 INJECTION INTRAMUSCULAR; INTRAVENOUS
Status: DISCONTINUED | OUTPATIENT
Start: 2022-11-22 | End: 2022-11-22 | Stop reason: HOSPADM

## 2022-11-22 RX ORDER — POLYMYXIN B 500000 [USP'U]/1
INJECTION, POWDER, LYOPHILIZED, FOR SOLUTION INTRAMUSCULAR; INTRATHECAL; INTRAVENOUS; OPHTHALMIC PRN
Status: DISCONTINUED | OUTPATIENT
Start: 2022-11-22 | End: 2022-11-22 | Stop reason: HOSPADM

## 2022-11-22 RX ORDER — NALOXONE HYDROCHLORIDE 0.4 MG/ML
INJECTION, SOLUTION INTRAMUSCULAR; INTRAVENOUS; SUBCUTANEOUS PRN
Status: DISCONTINUED | OUTPATIENT
Start: 2022-11-22 | End: 2022-11-23

## 2022-11-22 RX ADMIN — DEXMEDETOMIDINE HYDROCHLORIDE 10 MCG: 100 INJECTION, SOLUTION, CONCENTRATE INTRAVENOUS at 16:01

## 2022-11-22 RX ADMIN — SUCCINYLCHOLINE CHLORIDE 160 MG: 20 INJECTION, SOLUTION INTRAMUSCULAR; INTRAVENOUS at 13:06

## 2022-11-22 RX ADMIN — PHENYLEPHRINE HYDROCHLORIDE 100 MCG: 10 INJECTION INTRAVENOUS at 16:34

## 2022-11-22 RX ADMIN — EPHEDRINE SULFATE 20 MG: 50 INJECTION INTRAMUSCULAR; INTRAVENOUS; SUBCUTANEOUS at 13:18

## 2022-11-22 RX ADMIN — CEFAZOLIN 2000 MG: 2 INJECTION, POWDER, FOR SOLUTION INTRAMUSCULAR; INTRAVENOUS at 20:55

## 2022-11-22 RX ADMIN — FENTANYL CITRATE 50 MCG: 50 INJECTION, SOLUTION INTRAMUSCULAR; INTRAVENOUS at 16:54

## 2022-11-22 RX ADMIN — DEXAMETHASONE SODIUM PHOSPHATE 10 MG: 10 INJECTION, SOLUTION INTRAMUSCULAR; INTRAVENOUS at 13:25

## 2022-11-22 RX ADMIN — PROPOFOL 200 MG: 10 INJECTION, EMULSION INTRAVENOUS at 13:05

## 2022-11-22 RX ADMIN — DEXMEDETOMIDINE HYDROCHLORIDE 4 MCG: 100 INJECTION, SOLUTION, CONCENTRATE INTRAVENOUS at 13:30

## 2022-11-22 RX ADMIN — PHENYLEPHRINE HYDROCHLORIDE 100 MCG: 10 INJECTION INTRAVENOUS at 15:30

## 2022-11-22 RX ADMIN — PHENYLEPHRINE HYDROCHLORIDE 100 MCG: 10 INJECTION INTRAVENOUS at 15:10

## 2022-11-22 RX ADMIN — PHENYLEPHRINE HYDROCHLORIDE 100 MCG: 10 INJECTION INTRAVENOUS at 13:25

## 2022-11-22 RX ADMIN — FENTANYL CITRATE 100 MCG: 50 INJECTION, SOLUTION INTRAMUSCULAR; INTRAVENOUS at 13:06

## 2022-11-22 RX ADMIN — SODIUM CHLORIDE, SODIUM LACTATE, POTASSIUM CHLORIDE, AND CALCIUM CHLORIDE: 600; 310; 30; 20 INJECTION, SOLUTION INTRAVENOUS at 13:12

## 2022-11-22 RX ADMIN — EPHEDRINE SULFATE 10 MG: 50 INJECTION INTRAMUSCULAR; INTRAVENOUS; SUBCUTANEOUS at 13:25

## 2022-11-22 RX ADMIN — PROPOFOL 150 MCG/KG/MIN: 10 INJECTION, EMULSION INTRAVENOUS at 13:11

## 2022-11-22 RX ADMIN — FAMOTIDINE 20 MG: 20 TABLET ORAL at 07:40

## 2022-11-22 RX ADMIN — FAMOTIDINE 20 MG: 20 TABLET ORAL at 20:55

## 2022-11-22 RX ADMIN — PHENYLEPHRINE HYDROCHLORIDE 20 MCG/MIN: 10 INJECTION INTRAVENOUS at 14:15

## 2022-11-22 RX ADMIN — GABAPENTIN 600 MG: 600 TABLET, FILM COATED ORAL at 07:40

## 2022-11-22 RX ADMIN — PROPOFOL 100 MG: 10 INJECTION, EMULSION INTRAVENOUS at 13:43

## 2022-11-22 RX ADMIN — EPHEDRINE SULFATE 20 MG: 50 INJECTION INTRAMUSCULAR; INTRAVENOUS; SUBCUTANEOUS at 13:20

## 2022-11-22 RX ADMIN — EPHEDRINE SULFATE 10 MG: 50 INJECTION INTRAMUSCULAR; INTRAVENOUS; SUBCUTANEOUS at 14:04

## 2022-11-22 RX ADMIN — EPHEDRINE SULFATE 10 MG: 50 INJECTION INTRAMUSCULAR; INTRAVENOUS; SUBCUTANEOUS at 14:10

## 2022-11-22 RX ADMIN — GABAPENTIN 600 MG: 600 TABLET, FILM COATED ORAL at 19:31

## 2022-11-22 RX ADMIN — DEXMEDETOMIDINE HYDROCHLORIDE 10 MCG: 100 INJECTION, SOLUTION, CONCENTRATE INTRAVENOUS at 12:55

## 2022-11-22 RX ADMIN — OXYCODONE AND ACETAMINOPHEN 1 TABLET: 10; 325 TABLET ORAL at 02:07

## 2022-11-22 RX ADMIN — OXYCODONE AND ACETAMINOPHEN 1 TABLET: 10; 325 TABLET ORAL at 09:10

## 2022-11-22 RX ADMIN — CEFAZOLIN 2000 MG: 2 INJECTION, POWDER, FOR SOLUTION INTRAMUSCULAR; INTRAVENOUS at 13:17

## 2022-11-22 RX ADMIN — SODIUM CHLORIDE, SODIUM LACTATE, POTASSIUM CHLORIDE, AND CALCIUM CHLORIDE: 600; 310; 30; 20 INJECTION, SOLUTION INTRAVENOUS at 12:22

## 2022-11-22 RX ADMIN — DEXMEDETOMIDINE HYDROCHLORIDE 6 MCG: 100 INJECTION, SOLUTION, CONCENTRATE INTRAVENOUS at 14:21

## 2022-11-22 RX ADMIN — SENNOSIDES AND DOCUSATE SODIUM 1 TABLET: 50; 8.6 TABLET ORAL at 20:55

## 2022-11-22 RX ADMIN — DEXMEDETOMIDINE HYDROCHLORIDE 10 MCG: 100 INJECTION, SOLUTION, CONCENTRATE INTRAVENOUS at 14:52

## 2022-11-22 RX ADMIN — LIDOCAINE HYDROCHLORIDE 5 MG: 10 INJECTION, SOLUTION EPIDURAL; INFILTRATION; INTRACAUDAL; PERINEURAL at 13:05

## 2022-11-22 RX ADMIN — PHENYLEPHRINE HYDROCHLORIDE 100 MCG: 10 INJECTION INTRAVENOUS at 15:20

## 2022-11-22 RX ADMIN — REMIFENTANIL HYDROCHLORIDE 0.12 MCG/KG/MIN: 1 INJECTION, POWDER, LYOPHILIZED, FOR SOLUTION INTRAVENOUS at 13:14

## 2022-11-22 RX ADMIN — GABAPENTIN 600 MG: 600 TABLET, FILM COATED ORAL at 22:52

## 2022-11-22 RX ADMIN — PHENYLEPHRINE HYDROCHLORIDE 100 MCG: 10 INJECTION INTRAVENOUS at 13:53

## 2022-11-22 RX ADMIN — ONDANSETRON 4 MG: 2 INJECTION INTRAMUSCULAR; INTRAVENOUS at 16:32

## 2022-11-22 RX ADMIN — Medication: at 19:15

## 2022-11-22 RX ADMIN — EPHEDRINE SULFATE 20 MG: 50 INJECTION INTRAMUSCULAR; INTRAVENOUS; SUBCUTANEOUS at 13:52

## 2022-11-22 ASSESSMENT — PAIN DESCRIPTION - DESCRIPTORS
DESCRIPTORS: SHARP;STABBING
DESCRIPTORS: TIGHTNESS

## 2022-11-22 ASSESSMENT — ENCOUNTER SYMPTOMS
SHORTNESS OF BREATH: 0
ABDOMINAL PAIN: 0
TROUBLE SWALLOWING: 0
NAUSEA: 0
CHEST TIGHTNESS: 0
BACK PAIN: 1
CONSTIPATION: 0
VOMITING: 0
SORE THROAT: 0
DIARRHEA: 0
COLOR CHANGE: 0

## 2022-11-22 ASSESSMENT — PAIN SCALES - GENERAL
PAINLEVEL_OUTOF10: 6
PAINLEVEL_OUTOF10: 4
PAINLEVEL_OUTOF10: 9
PAINLEVEL_OUTOF10: 8
PAINLEVEL_OUTOF10: 9

## 2022-11-22 ASSESSMENT — PAIN DESCRIPTION - ORIENTATION
ORIENTATION: UPPER
ORIENTATION: UPPER

## 2022-11-22 ASSESSMENT — PAIN DESCRIPTION - LOCATION
LOCATION: NECK;BACK
LOCATION: BACK;NECK
LOCATION: BACK;NECK

## 2022-11-22 ASSESSMENT — PAIN DESCRIPTION - DIRECTION: RADIATING_TOWARDS: NO

## 2022-11-22 ASSESSMENT — PAIN DESCRIPTION - ONSET: ONSET: ON-GOING

## 2022-11-22 ASSESSMENT — PAIN - FUNCTIONAL ASSESSMENT
PAIN_FUNCTIONAL_ASSESSMENT: ACTIVITIES ARE NOT PREVENTED
PAIN_FUNCTIONAL_ASSESSMENT: PREVENTS OR INTERFERES SOME ACTIVE ACTIVITIES AND ADLS
PAIN_FUNCTIONAL_ASSESSMENT: ACTIVITIES ARE NOT PREVENTED

## 2022-11-22 ASSESSMENT — LIFESTYLE VARIABLES: SMOKING_STATUS: 1

## 2022-11-22 ASSESSMENT — PAIN DESCRIPTION - FREQUENCY: FREQUENCY: CONTINUOUS

## 2022-11-22 ASSESSMENT — PAIN DESCRIPTION - PAIN TYPE: TYPE: SURGICAL PAIN;ACUTE PAIN

## 2022-11-22 NOTE — ANESTHESIA POSTPROCEDURE EVALUATION
Department of Anesthesiology  Postprocedure Note    Patient: Lindsey Taylor  MRN: [id number]  YOB: 1965  Date of evaluation: 11/22/2022      Procedure Summary     Date: 11/22/22 Room / Location: [address]    Anesthesia Start: 2926 Anesthesia Stop: 4828    Procedure: C3-6 LAMINECTOMY FUSION - POSTERIOR WITH NEUROMONITORING (Neck) Diagnosis:       Cervical myelopathy ([address]      Cervical spinal cord compression (HCC)      (Cervical myelopathy ([address] [G95.9])      (Cervical spinal cord compression ([address] [G95.20])    Surgeons: Nicole Weeks MD Responsible Provider: CARYN Landaverde CRNA    Anesthesia Type: general, TIVA ASA Status: 2          Anesthesia Type: No value filed.     Tiffanie Phase I: Tiffanie Score: 6    Tiffanie Phase II:        Anesthesia Post Evaluation    Patient location during evaluation: PACU  Patient participation: waiting for patient participation  Level of consciousness: responsive to physical stimuli  Pain score: 0  Airway patency: patent  Nausea & Vomiting: no vomiting and no nausea  Complications: no  Cardiovascular status: hemodynamically stable  Respiratory status: acceptable, room air and spontaneous ventilation  Hydration status: stable

## 2022-11-22 NOTE — CARE COORDINATION
The [address] at [facility]  Notification of Admission Decision      [] Patient has been accepted for admit to [facility] on :       Please write discharge orders and summary prior to discharge. [] Patient acceptance to Rehab pending the following :    [x] Eval in progress : patient going to surgery today. Will await PT/OT evals       [] Patient determined to be ineligible for services at [facility] because : We recommend you consider        Thank you for your referral, we appreciate you. If you have any questions, please feel   free to contact me at [phone].    Electronically Signed by Salvador De Souza, Admissions Coordinator 11/22/2022 10:26 AM

## 2022-11-22 NOTE — PROGRESS NOTES
NEUROSURGERY PROGRESS NOTE      Chief Complaint:   Chief Complaint   Patient presents with    Spasms     Was told to come back if spasms were worsening, is set to have to have surgery on 11/29    Numbness     Patient states that it feels \"numb\" in his abdomen         Interval Update:    Patient reports poor sleep overnight. Multiple painful spasms in back and legs. He continues to endorse numbness and clumsiness in his hands. He denies difficulty with bowel or bladder control. He denies questions about the proposed surgery. HPI:     Patient known to me from recent in-office evaluation with plan for surgery on 11/29. His myelopathic symptoms appear to be worsening. His gait and balance impairment is progressing and he has new numbness in his abdomen. Exam remains significant for bilateral Lyon's, numbness in the hands and ataxic gait. His wife is having difficulty caring for him at home. Objective:    /85   Pulse 77   Temp 98.2 °F (36.8 °C) (Temporal)   Resp 20   Ht 5' 7\" (1.702 m)   Wt 165 lb (74.8 kg)   SpO2 96%   BMI 25.84 kg/m²       Intake/Output Summary (Last 24 hours) at 11/22/2022 0817  Last data filed at 11/21/2022 1300  Gross per 24 hour   Intake 240 ml   Output --   Net 240 ml           Physical Exam:    General: alert, cooperative, no distress  Cardiorespiratory: unlabored breathing  Abdomen: soft and nondistended        Neurologic Exam:    Mental Status: Alert, oriented, thought content appropriate  Cranial Nerves: PERRL, EOM full, face symmetric  Motor: 5/5 BUE deltoid, biceps, triceps, 4/5 . 5/5 BLE HF/KE/ADF/APF  Somatosensory: Numbness in the bilateral hands, tingling in the bilateral legs  Reflexes: 3+ bilateral biceps and patella.  + Lyon's bilaterally, + ankle clonus bilaterally  Gait: Not tested      Imaging:    CT cervical spine reviewed. There is advanced multilevel DDD and spondylosis with large dorsal and ventral osteophytes at C3/4, C4/5 and C5/6. There is mild cervical kyphosis of ~ 9-degrees. Pertinent Labs:  Lab Results   Component Value Date    WBC 7.4 11/22/2022    HGB 11.9 (L) 11/22/2022    HCT 37.1 (L) 11/22/2022    MCV 96.1 (H) 11/22/2022     11/22/2022     Lab Results   Component Value Date/Time     11/22/2022 01:46 AM    K 3.8 11/22/2022 01:46 AM     11/22/2022 01:46 AM    CO2 23 11/22/2022 01:46 AM    BUN 26 11/22/2022 01:46 AM    CREATININE 0.7 11/22/2022 01:46 AM    GLUCOSE 147 11/22/2022 01:46 AM    CALCIUM 8.9 11/22/2022 01:46 AM              Assessment and Plan:    62 y.o. M with known severe cervical stenosis and myelopathy. He was scheduled for surgery (C3-6 laminectomy and fusion) on 11/29/2022 however he was admitted with progressive neurologic symptoms. As such, I have recommended that we proceed with surgery in an accelerated fashion. The procedure was explained to him in detail using his radiographic images. Specific risks of bleeding, infection, neurologic injury/paralysis, CSF leak, vascular injury/stroke, instrumentation failure, non-union, need for revision or additional surgery and cardiopulmonary complications were all discussed. He voiced understanding and requested that we proceed with surgery. He is on the surgery schedule for this afternoon.         Electronically signed by Wilian Ewing MD on 11/22/2022 at 8:17 AM

## 2022-11-22 NOTE — PROGRESS NOTES
[address] [facility]      Patient:  Cranston Runner  YOB: 1965  Date of Service: 11/22/2022  MRN: [id number]   Acct: [de-identified]   Primary Care Physician: Elly Meyer DO  Advance Directive: Full Code  Admit Date: 11/20/2022       Hospital Day: 2  Portions of this note have been copied forward, however, changed to reflect the most current clinical status of this patient. CHIEF COMPLAINT back spasms and numbness    SUBJECTIVE:  Did not sleep well last night. Reports spasms \"every time I move. \" Patient states he is very anxious about surgery but is ready for relief. CUMULATIVE HOSPITAL COURSE:  Patient is a 63-year-old male with past medical history of tobacco abuse and cervical spinal cord compression who presented to [address] [place] [address] ED with complaints of worsening neurological symptoms. Patient reported being seen in the outpatient setting by neurosurgery with recommendation for a C3-6 laminectomy with fusion scheduled for 11/29/2022. Patient states he was told to go to the ED for any worsening symptoms. Patient reported having worsening bilateral upper extremity weakness and numbness. Patient also reported having numbness while of the abdomen. Patient reports having more difficulty ambulating. Patient denies issues with bowel and bladder control. Patient was admitted to the hospitalist service with neurosurgery consultation. CT cervical spine without contrast indicated straightening of the normal cervical lordosis, grade 1 anterolisthesis of C2 on C3, C7 on T1, and T2 on T3, grade 1 retrolisthesis of C3 on C4, multilevel advanced degenerative changes of the cervical spine causing severe spinal canal stenosis at C4 C3, varying degrees of neuroforaminal stenosis including severe neuroforaminal stenosis at multiple levels. Due to progression of symptoms neurosurgery recommends proceeding with surgery today.     Review of Systems:   Review of Systems   Constitutional:  Negative for appetite change, chills, fatigue and fever. HENT:  Negative for sore throat and trouble swallowing. Respiratory:  Negative for chest tightness and shortness of breath. Cardiovascular:  Negative for chest pain, palpitations and leg swelling. Gastrointestinal:  Negative for abdominal pain, constipation, diarrhea, nausea and vomiting. Genitourinary:  Negative for difficulty urinating, dysuria, flank pain, frequency and urgency. Musculoskeletal:  Positive for back pain. Negative for neck pain. Back spasms   Skin:  Negative for color change and wound. Neurological:  Positive for weakness and numbness. Negative for headaches. Psychiatric/Behavioral:  Negative for sleep disturbance. The patient is nervous/anxious. 14 point review of systems is negative except as specifically addressed above. Objective:   VITALS:  /85   Pulse 77   Temp 98.2 °F (36.8 °C) (Temporal)   Resp 20   Ht 5' 7\" (1.702 m)   Wt 165 lb (74.8 kg)   SpO2 96%   BMI 25.84 kg/m²   24HR INTAKE/OUTPUT:    Intake/Output Summary (Last 24 hours) at 11/22/2022 0829  Last data filed at 11/21/2022 1300  Gross per 24 hour   Intake 240 ml   Output --   Net 240 ml       Physical Exam  Vitals reviewed. Constitutional:       Appearance: He is not ill-appearing. HENT:      Mouth/Throat:      Mouth: Mucous membranes are moist.      Pharynx: Oropharynx is clear. Eyes:      Pupils: Pupils are equal, round, and reactive to light. Cardiovascular:      Rate and Rhythm: Normal rate and regular rhythm. Pulses: Normal pulses. Heart sounds: Normal heart sounds. Pulmonary:      Effort: Pulmonary effort is normal.      Breath sounds: Normal breath sounds. Abdominal:      General: Abdomen is flat. Bowel sounds are normal. There is no distension. Palpations: Abdomen is soft. Tenderness: There is no abdominal tenderness. There is no guarding. Musculoskeletal:      Cervical back: Neck supple. No tenderness.       Right lower leg: No edema. Left lower leg: No edema. Skin:     General: Skin is warm and dry. Capillary Refill: Capillary refill takes less than 2 seconds. Neurological:      Mental Status: He is alert and oriented to person, place, and time. Sensory: Sensory deficit present. Motor: Weakness present. Gait: Gait abnormal.           Medications:      lactated ringers 75 mL/hr at 11/21/22 2322    sodium chloride        nicotine  1 patch TransDERmal Daily    gabapentin  600 mg Oral 5x Daily    [Held by provider] meloxicam  7.5 mg Oral Daily    sodium chloride flush  5-40 mL IntraVENous 2 times per day    famotidine  20 mg Oral BID     tiZANidine, sodium chloride flush, sodium chloride, potassium chloride, magnesium sulfate, promethazine **OR** ondansetron, polyethylene glycol, acetaminophen **OR** acetaminophen, oxyCODONE-acetaminophen  Diet NPO     Lab and other Data:     Recent Labs     11/20/22  1045 11/21/22  0143 11/22/22  0146   WBC 7.7 9.6 7.4   HGB 14.0 12.1* 11.9*    172 167     Recent Labs     11/20/22  1045 11/21/22  0143 11/22/22  0146    140 141   K 4.2 4.4 3.8    105 106   CO2 30* 21* 23   BUN 18 24* 26*   CREATININE 0.6 0.7 0.7   GLUCOSE 176* 122* 147*     Recent Labs     11/20/22  1045   AST 26   ALT 34   BILITOT 0.5   ALKPHOS 72     Troponin T: No results for input(s): TROPONINI in the last 72 hours. Pro-BNP: No results for input(s): BNP in the last 72 hours. INR:   Recent Labs     11/20/22  1045   INR 0.95     UA:No results for input(s): NITRITE, COLORU, PHUR, LABCAST, WBCUA, RBCUA, MUCUS, TRICHOMONAS, YEAST, BACTERIA, CLARITYU, SPECGRAV, LEUKOCYTESUR, UROBILINOGEN, BILIRUBINUR, BLOODU, GLUCOSEU, AMORPHOUS in the last 72 hours. Invalid input(s): Judeth Plate  A1C: No results for input(s): LABA1C in the last 72 hours. ABG:No results for input(s): PHART, DUH0HCU, PO2ART, AQT0KOM, BEART, HGBAE, K3EPSBUS, CARBOXHGBART in the last 72 hours.     RAD:   CT CERVICAL SPINE WO CONTRAST    Result Date: 11/21/2022  Impression: 1. No acute osseous abnormality of the cervical spine. 2.Straightening of the normal cervical lordosis. 3.Grade 1 anterolisthesis of C2 on C3, C7 on T1 and T2 on T3. 4.Grade 1 retrolisthesis of C3 on C4. 4.Multilevel advanced degenerative changes of the cervical spine, causing severe spinal canal stenosis at C3-C4. 5.Varying degrees of neuroforaminal stenosis, including severe neuroforaminal stenosis at multiple levels.        Assessment/Plan   Principal Problem:    Back spasm / Cervical myelopathy (HCC) / Cervical spinal cord compression (HCC)              -neurosurgery on board                          -plan for C3-6 posterior cervical decompression and fusion today with Dr. Joann Benz              -NPO              -continue current gabapentin and percocet as prescribed by pain management              -PT/OT post-op when ok with neurosurgery              -fall precautions              -bed alarm              -neurovascular checks     Active Problems:    Spondylolisthesis of lumbosacral region              -noted, continue current pain management regimen        Tobacco use              -nicoderm patch              -smoking cessation education        DVT Prophylaxis:SCD    Malissa Meigs, APRN - CNP, 11/22/2022 8:29 AM

## 2022-11-22 NOTE — BRIEF OP NOTE
Brief Postoperative Note      Patient: Diogo Delong  YOB: 1965  MRN: [id number]    Date of Procedure: 11/22/2022    Pre-Op Diagnosis:     Cervical myelopathy ([facility] [address] [G95.9]  Cervical spinal cord compression ([address] [G95.20]    Post-Op Diagnosis: Same       Procedure(s):  1) C3-6 decompressive laminectomy  2) C3-6 Posterior instrumented fusion with Medtronic Infinity lateral mass screws and rods, local autograft and Gail DMB  3) Intraoperative fluoroscopy with interpretation  4) Intraoperative neuromonitoring with SSEP, MEP and upper extremity EMG    Surgeon(s):  Jose Farris MD    Assistant:  * No surgical staff found *    Anesthesia: General    Estimated Blood Loss (mL): less than 907     Complications: None    Specimens:   * No specimens in log *    Implants:  Implant Name Type Inv. Item Serial No.  Lot No. LRB No. Used Action   GRAFT BONE SUB 3ML DEMIN BONE MTRX PUTTY GRFTON - [id number]  GRAFT BONE SUB 3ML DEMIN BONE MTRX PUTTY GRFTON [id number] [employer] SPINALGRAFT TECH-WD  N/A 1 Implanted   GRAFT BONE SUB 3ML DEMIN BONE MTRX PUTTY GRFTON - [id number]  GRAFT BONE SUB 3ML DEMIN BONE MTRX PUTTY GRFTON [id number] [employer] SPINALGRAFT TECH-WD  N/A 1 Implanted   SCREW SPNL L12MM DIA3. 5MM OCCIPITOCERVICAL UP THOR - [id number]  SCREW SPNL L12MM DIA3. 5MM OCCIPITOCERVICAL UP THOR  [employer] SOFAMOR DANEK-WD  N/A 8 Implanted   SET SCREW SPINAL M6 - [id number]  SET SCREW SPINAL M6  [employer] SOFAMOR DANEK-WD  N/A 8 Implanted   VARINDER SPNL L50MM DIA3. 5MM OCCIPITOCERVICAL UP THOR PRECUT - [id number]  VARINDER SPNL L50MM DIA3. 5MM OCCIPITOCERVICAL UP THOR PRECUT  [employer] SOFAMOR DANEK-WD  N/A 2 Implanted         Drains:   Closed/Suction Drain Posterior Neck (Active)       Urinary Catheter 11/22/22 (Active)       Findings: C3-6 laminectomy and fusion with lateral mass screws and rods achieved. No intraoperative issues.   Neuromonitoring signals largely unreliable due to interference and poor baseline (pre-flip) waves.   See operative dictation, OX#[id number]    Electronically signed by Michela Aguirre MD on 11/22/2022 at 4:54 PM

## 2022-11-22 NOTE — ANESTHESIA PRE PROCEDURE
Department of Anesthesiology  Preprocedure Note       Name:  Sandip Espinoza   Age:  62 y.o.  :  1965                                          MRN:  [id number]         Date:  2022      Surgeon: Felecia Bond):  Jg Strickland MD    Procedure: Procedure(s):  C3-6 LAMINECTOMY FUSION - POSTERIOR WITH NEUROMONITORING    Medications prior to admission:   Prior to Admission medications    Medication Sig Start Date End Date Taking? Authorizing Provider   oxyCODONE-acetaminophen (PERCOCET)  MG per tablet Take 1 tablet by mouth every 6 hours as needed for Pain. Yes Historical Provider, MD   gabapentin (NEURONTIN) 400 MG capsule Take 600 mg by mouth 3 times daily. 20   Historical Provider, MD   meloxicam (MOBIC) 7.5 MG tablet Take 7.5 mg by mouth daily 20   Historical Provider, MD   tiZANidine (ZANAFLEX) 4 MG tablet Take 4 mg by mouth every 6 hours as needed    Historical Provider, MD   methylPREDNISolone (MEDROL, ALIYAH,) 4 MG tablet Take by mouth.  22   mAanda Gonsalez MD       Current medications:    Current Facility-Administered Medications   Medication Dose Route Frequency Provider Last Rate Last Admin    nicotine (NICODERM CQ) 21 MG/24HR 1 patch  1 patch TransDERmal Daily Alia Mera MD   1 patch at 22 0739    gabapentin (NEURONTIN) tablet 600 mg  600 mg Oral 5x Daily Nino Jacome, APRN - CNP   600 mg at 22 0740    lactated ringers infusion   IntraVENous Continuous Jg Strickland MD 75 mL/hr at 22 1152 NoRateChange at 22 1152    [Held by provider] meloxicam (MOBIC) tablet 7.5 mg  7.5 mg Oral Daily Min L Day, APRN - CNP   7.5 mg at 22 0724    tiZANidine (ZANAFLEX) tablet 4 mg  4 mg Oral Q6H PRN Min L Day, APRN - CNP   4 mg at 22 2320    sodium chloride flush 0.9 % injection 5-40 mL  5-40 mL IntraVENous 2 times per day Min L Day, APRN - CNP   10 mL at 22 0724    sodium chloride flush 0.9 % injection 5-40 mL  5-40 mL IntraVENous PRN Min L Day, APRN - CNP        0.9 % sodium chloride infusion  25 mL IntraVENous PRN Min L Day, APRN - CNP        potassium chloride 10 mEq/100 mL IVPB (Peripheral Line)  10 mEq IntraVENous PRN Min L Day, APRN - CNP        magnesium sulfate 2000 mg in 50 mL IVPB premix  2,000 mg IntraVENous PRN Min L Day, APRN - CNP        promethazine (PHENERGAN) tablet 12.5 mg  12.5 mg Oral Q6H PRN Min L Day, APRN - CNP        Or    ondansetron (ZOFRAN) injection 4 mg  4 mg IntraVENous Q6H PRN Min L Day, APRN - CNP        polyethylene glycol (GLYCOLAX) packet 17 g  17 g Oral Daily PRN Min L Day, APRN - CNP        famotidine (PEPCID) tablet 20 mg  20 mg Oral BID Min L Day, APRN - CNP   20 mg at 11/22/22 0740    acetaminophen (TYLENOL) tablet 650 mg  650 mg Oral Q6H PRN Min L Day, APRN - CNP        Or    acetaminophen (TYLENOL) suppository 650 mg  650 mg Rectal Q6H PRN Min L Day, APRN - CNP        oxyCODONE-acetaminophen (PERCOCET)  MG per tablet 1 tablet  1 tablet Oral Q6H PRN Steffanie Rivera MD   1 tablet at 11/22/22 0910       Allergies:  No Known Allergies    Problem List:    Patient Active Problem List   Diagnosis Code    Cervical myelopathy (HCC) G95.9    Cervical spinal cord compression (HCC) G95.20    Spondylolisthesis of lumbosacral region M43.17    Back spasm M62.830    Tobacco use Z72.0       Past Medical History:  History reviewed. No pertinent past medical history. Past Surgical History:  History reviewed. No pertinent surgical history.     Social History:    Social History     Tobacco Use    Smoking status: Every Day     Types: Cigarettes    Smokeless tobacco: Never   Substance Use Topics    Alcohol use: Not Currently                                Ready to quit: Not Answered  Counseling given: Not Answered      Vital Signs (Current):   Vitals:    11/22/22 0749 11/22/22 0802 11/22/22 0910 11/22/22 0940   BP: 121/85      Pulse: 77      Resp: 20  16 16   Temp: 98.2 °F (36.8 °C)      TempSrc: Temporal      SpO2: 95% 96%     Weight:       Height:                                                  BP Readings from Last 3 Encounters:   11/22/22 121/85   11/14/22 (!) 145/82   11/08/22 114/82       NPO Status:                                                                                 BMI:   Wt Readings from Last 3 Encounters:   11/20/22 165 lb (74.8 kg)   11/14/22 165 lb (74.8 kg)   11/08/22 165 lb (74.8 kg)     Body mass index is 25.84 kg/m². CBC:   Lab Results   Component Value Date/Time    WBC 7.4 11/22/2022 01:46 AM    RBC 3.86 11/22/2022 01:46 AM    HGB 11.9 11/22/2022 01:46 AM    HCT 37.1 11/22/2022 01:46 AM    MCV 96.1 11/22/2022 01:46 AM    RDW 14.6 11/22/2022 01:46 AM     11/22/2022 01:46 AM       CMP:   Lab Results   Component Value Date/Time     11/22/2022 01:46 AM    K 3.8 11/22/2022 01:46 AM     11/22/2022 01:46 AM    CO2 23 11/22/2022 01:46 AM    BUN 26 11/22/2022 01:46 AM    CREATININE 0.7 11/22/2022 01:46 AM    LABGLOM >60 11/22/2022 01:46 AM    GLUCOSE 147 11/22/2022 01:46 AM    PROT 8.0 11/20/2022 10:45 AM    CALCIUM 8.9 11/22/2022 01:46 AM    BILITOT 0.5 11/20/2022 10:45 AM    ALKPHOS 72 11/20/2022 10:45 AM    AST 26 11/20/2022 10:45 AM    ALT 34 11/20/2022 10:45 AM       POC Tests: No results for input(s): POCGLU, POCNA, POCK, POCCL, POCBUN, POCHEMO, POCHCT in the last 72 hours.     Coags:   Lab Results   Component Value Date/Time    PROTIME 12.6 11/20/2022 10:45 AM    INR 0.95 11/20/2022 10:45 AM    APTT 27.5 11/20/2022 10:45 AM       HCG (If Applicable): No results found for: PREGTESTUR, PREGSERUM, HCG, HCGQUANT     ABGs: No results found for: PHART, PO2ART, ANV2ZTI, TQT2PPE, BEART, D9JNPCUA     Type & Screen (If Applicable):  No results found for: LABABO, LABRH    Drug/Infectious Status (If Applicable):  No results found for: HIV, HEPCAB    COVID-19 Screening (If Applicable):   Lab Results   Component Value Date/Time    COVID19 Not Detected 11/20/2022 11:16 AM           Anesthesia Evaluation  Patient summary reviewed and Nursing notes reviewed no history of anesthetic complications:   Airway: Mallampati: I  TM distance: >3 FB   Neck ROM: full  Mouth opening: > = 3 FB   Dental:          Pulmonary:normal exam    (+) current smoker                           Cardiovascular:Negative CV ROS             Beta Blocker:  Not on Beta Blocker         Neuro/Psych:      (-) seizures and CVA           GI/Hepatic/Renal:        (-) GERD       Endo/Other:    (+) : arthritis:., .                 Abdominal:             Vascular: negative vascular ROS. Other Findings:           Anesthesia Plan      general and TIVA     ASA 2     (Glidescope for intubation )  Induction: intravenous. MIPS: Postoperative opioids intended and Prophylactic antiemetics administered. Anesthetic plan and risks discussed with patient.                         Octaviano Hartman MD   11/22/2022

## 2022-11-23 LAB
ANION GAP SERPL CALCULATED.3IONS-SCNC: 8 MMOL/L (ref 7–19)
BASOPHILS ABSOLUTE: 0 K/UL (ref 0–0.2)
BASOPHILS RELATIVE PERCENT: 0.1 % (ref 0–1)
BUN BLDV-MCNC: 21 MG/DL (ref 6–20)
CALCIUM SERPL-MCNC: 8.9 MG/DL (ref 8.6–10)
CHLORIDE BLD-SCNC: 103 MMOL/L (ref 98–111)
CO2: 26 MMOL/L (ref 22–29)
CREAT SERPL-MCNC: 0.6 MG/DL (ref 0.5–1.2)
EOSINOPHILS ABSOLUTE: 0 K/UL (ref 0–0.6)
EOSINOPHILS RELATIVE PERCENT: 0.1 % (ref 0–5)
GFR SERPL CREATININE-BSD FRML MDRD: >60 ML/MIN/{1.73_M2}
GLUCOSE BLD-MCNC: 157 MG/DL (ref 74–109)
HCT VFR BLD CALC: 34.8 % (ref 42–52)
HEMOGLOBIN: 11.4 G/DL (ref 14–18)
IMMATURE GRANULOCYTES #: 0.1 K/UL
LYMPHOCYTES ABSOLUTE: 0.7 K/UL (ref 1.1–4.5)
LYMPHOCYTES RELATIVE PERCENT: 8.4 % (ref 20–40)
MCH RBC QN AUTO: 31.1 PG (ref 27–31)
MCHC RBC AUTO-ENTMCNC: 32.8 G/DL (ref 33–37)
MCV RBC AUTO: 95.1 FL (ref 80–94)
MONOCYTES ABSOLUTE: 0.5 K/UL (ref 0–0.9)
MONOCYTES RELATIVE PERCENT: 6.1 % (ref 0–10)
NEUTROPHILS ABSOLUTE: 7 K/UL (ref 1.5–7.5)
NEUTROPHILS RELATIVE PERCENT: 84.7 % (ref 50–65)
PDW BLD-RTO: 14.1 % (ref 11.5–14.5)
PLATELET # BLD: 161 K/UL (ref 130–400)
PMV BLD AUTO: 11.2 FL (ref 9.4–12.4)
POTASSIUM REFLEX MAGNESIUM: 4.6 MMOL/L (ref 3.5–5)
RBC # BLD: 3.66 M/UL (ref 4.7–6.1)
SODIUM BLD-SCNC: 137 MMOL/L (ref 136–145)
WBC # BLD: 8.2 K/UL (ref 4.8–10.8)

## 2022-11-23 PROCEDURE — 97116 GAIT TRAINING THERAPY: CPT

## 2022-11-23 PROCEDURE — 97162 PT EVAL MOD COMPLEX 30 MIN: CPT

## 2022-11-23 PROCEDURE — 36415 COLL VENOUS BLD VENIPUNCTURE: CPT

## 2022-11-23 PROCEDURE — 99024 POSTOP FOLLOW-UP VISIT: CPT | Performed by: NEUROLOGICAL SURGERY

## 2022-11-23 PROCEDURE — 2580000003 HC RX 258: Performed by: NEUROLOGICAL SURGERY

## 2022-11-23 PROCEDURE — 80048 BASIC METABOLIC PNL TOTAL CA: CPT

## 2022-11-23 PROCEDURE — 1210000000 HC MED SURG R&B

## 2022-11-23 PROCEDURE — 6370000000 HC RX 637 (ALT 250 FOR IP): Performed by: NEUROLOGICAL SURGERY

## 2022-11-23 PROCEDURE — 6360000002 HC RX W HCPCS: Performed by: NEUROLOGICAL SURGERY

## 2022-11-23 PROCEDURE — 6370000000 HC RX 637 (ALT 250 FOR IP): Performed by: NURSE PRACTITIONER

## 2022-11-23 PROCEDURE — 97535 SELF CARE MNGMENT TRAINING: CPT

## 2022-11-23 PROCEDURE — 2700000000 HC OXYGEN THERAPY PER DAY

## 2022-11-23 PROCEDURE — 94760 N-INVAS EAR/PLS OXIMETRY 1: CPT

## 2022-11-23 PROCEDURE — 85025 COMPLETE CBC W/AUTO DIFF WBC: CPT

## 2022-11-23 PROCEDURE — 97165 OT EVAL LOW COMPLEX 30 MIN: CPT

## 2022-11-23 PROCEDURE — 97530 THERAPEUTIC ACTIVITIES: CPT

## 2022-11-23 RX ORDER — TRAZODONE HYDROCHLORIDE 50 MG/1
50 TABLET ORAL NIGHTLY PRN
Status: DISCONTINUED | OUTPATIENT
Start: 2022-11-23 | End: 2022-11-28 | Stop reason: HOSPADM

## 2022-11-23 RX ORDER — SENNA AND DOCUSATE SODIUM 50; 8.6 MG/1; MG/1
2 TABLET, FILM COATED ORAL 2 TIMES DAILY
Status: DISCONTINUED | OUTPATIENT
Start: 2022-11-23 | End: 2022-11-28 | Stop reason: HOSPADM

## 2022-11-23 RX ORDER — POLYETHYLENE GLYCOL 3350 17 G/17G
17 POWDER, FOR SOLUTION ORAL DAILY
Status: DISCONTINUED | OUTPATIENT
Start: 2022-11-23 | End: 2022-11-28 | Stop reason: HOSPADM

## 2022-11-23 RX ORDER — HYDROMORPHONE HYDROCHLORIDE 1 MG/ML
0.5 INJECTION, SOLUTION INTRAMUSCULAR; INTRAVENOUS; SUBCUTANEOUS
Status: DISCONTINUED | OUTPATIENT
Start: 2022-11-23 | End: 2022-11-25

## 2022-11-23 RX ORDER — OXYCODONE AND ACETAMINOPHEN 10; 325 MG/1; MG/1
1 TABLET ORAL EVERY 4 HOURS PRN
Status: DISCONTINUED | OUTPATIENT
Start: 2022-11-23 | End: 2022-11-23

## 2022-11-23 RX ORDER — MECOBALAMIN 5000 MCG
5 TABLET,DISINTEGRATING ORAL NIGHTLY
Status: DISCONTINUED | OUTPATIENT
Start: 2022-11-23 | End: 2022-11-28 | Stop reason: HOSPADM

## 2022-11-23 RX ORDER — OXYCODONE AND ACETAMINOPHEN 10; 325 MG/1; MG/1
1 TABLET ORAL EVERY 4 HOURS PRN
Status: DISCONTINUED | OUTPATIENT
Start: 2022-11-23 | End: 2022-11-28 | Stop reason: HOSPADM

## 2022-11-23 RX ORDER — POLYETHYLENE GLYCOL 3350 17 G/17G
17 POWDER, FOR SOLUTION ORAL DAILY PRN
Status: DISCONTINUED | OUTPATIENT
Start: 2022-11-23 | End: 2022-11-23

## 2022-11-23 RX ORDER — OXYCODONE AND ACETAMINOPHEN 10; 325 MG/1; MG/1
2 TABLET ORAL EVERY 4 HOURS PRN
Status: DISCONTINUED | OUTPATIENT
Start: 2022-11-23 | End: 2022-11-28

## 2022-11-23 RX ORDER — HYDROMORPHONE HYDROCHLORIDE 1 MG/ML
0.25 INJECTION, SOLUTION INTRAMUSCULAR; INTRAVENOUS; SUBCUTANEOUS
Status: DISCONTINUED | OUTPATIENT
Start: 2022-11-23 | End: 2022-11-25

## 2022-11-23 RX ORDER — BISACODYL 10 MG
10 SUPPOSITORY, RECTAL RECTAL DAILY PRN
Status: DISCONTINUED | OUTPATIENT
Start: 2022-11-23 | End: 2022-11-28 | Stop reason: HOSPADM

## 2022-11-23 RX ADMIN — FAMOTIDINE 20 MG: 20 TABLET ORAL at 09:25

## 2022-11-23 RX ADMIN — POLYETHYLENE GLYCOL 3350 17 G: 17 POWDER, FOR SOLUTION ORAL at 09:25

## 2022-11-23 RX ADMIN — TIZANIDINE 4 MG: 4 TABLET ORAL at 11:33

## 2022-11-23 RX ADMIN — SENNOSIDES AND DOCUSATE SODIUM 2 TABLET: 50; 8.6 TABLET ORAL at 21:56

## 2022-11-23 RX ADMIN — CEFAZOLIN 2000 MG: 2 INJECTION, POWDER, FOR SOLUTION INTRAMUSCULAR; INTRAVENOUS at 04:17

## 2022-11-23 RX ADMIN — SENNOSIDES AND DOCUSATE SODIUM 2 TABLET: 50; 8.6 TABLET ORAL at 09:25

## 2022-11-23 RX ADMIN — GABAPENTIN 600 MG: 600 TABLET, FILM COATED ORAL at 21:57

## 2022-11-23 RX ADMIN — OXYCODONE AND ACETAMINOPHEN 2 TABLET: 10; 325 TABLET ORAL at 21:57

## 2022-11-23 RX ADMIN — GABAPENTIN 600 MG: 600 TABLET, FILM COATED ORAL at 13:36

## 2022-11-23 RX ADMIN — TIZANIDINE 4 MG: 4 TABLET ORAL at 04:22

## 2022-11-23 RX ADMIN — GABAPENTIN 600 MG: 600 TABLET, FILM COATED ORAL at 17:44

## 2022-11-23 RX ADMIN — OXYCODONE AND ACETAMINOPHEN 2 TABLET: 10; 325 TABLET ORAL at 09:22

## 2022-11-23 RX ADMIN — TIZANIDINE 4 MG: 4 TABLET ORAL at 20:29

## 2022-11-23 RX ADMIN — GABAPENTIN 600 MG: 600 TABLET, FILM COATED ORAL at 04:17

## 2022-11-23 RX ADMIN — SODIUM CHLORIDE, PRESERVATIVE FREE 10 ML: 5 INJECTION INTRAVENOUS at 21:57

## 2022-11-23 RX ADMIN — OXYCODONE AND ACETAMINOPHEN 2 TABLET: 10; 325 TABLET ORAL at 17:43

## 2022-11-23 RX ADMIN — FAMOTIDINE 20 MG: 20 TABLET ORAL at 21:56

## 2022-11-23 RX ADMIN — OXYCODONE AND ACETAMINOPHEN 2 TABLET: 10; 325 TABLET ORAL at 13:36

## 2022-11-23 RX ADMIN — GABAPENTIN 600 MG: 600 TABLET, FILM COATED ORAL at 09:23

## 2022-11-23 ASSESSMENT — PAIN SCALES - GENERAL
PAINLEVEL_OUTOF10: 8
PAINLEVEL_OUTOF10: 2
PAINLEVEL_OUTOF10: 9
PAINLEVEL_OUTOF10: 6
PAINLEVEL_OUTOF10: 8
PAINLEVEL_OUTOF10: 9
PAINLEVEL_OUTOF10: 8

## 2022-11-23 ASSESSMENT — PAIN DESCRIPTION - ORIENTATION
ORIENTATION: POSTERIOR;UPPER;MID
ORIENTATION: POSTERIOR;LOWER

## 2022-11-23 ASSESSMENT — PAIN DESCRIPTION - LOCATION
LOCATION: NECK
LOCATION: HEAD;NECK

## 2022-11-23 ASSESSMENT — ENCOUNTER SYMPTOMS
NAUSEA: 0
TROUBLE SWALLOWING: 0
CONSTIPATION: 0
ABDOMINAL PAIN: 0
COLOR CHANGE: 0
DIARRHEA: 0
SHORTNESS OF BREATH: 0
BACK PAIN: 1
VOMITING: 0
CHEST TIGHTNESS: 0
SORE THROAT: 0

## 2022-11-23 ASSESSMENT — PAIN DESCRIPTION - DESCRIPTORS
DESCRIPTORS: THROBBING;STABBING
DESCRIPTORS: THROBBING

## 2022-11-23 ASSESSMENT — PAIN - FUNCTIONAL ASSESSMENT: PAIN_FUNCTIONAL_ASSESSMENT: PREVENTS OR INTERFERES SOME ACTIVE ACTIVITIES AND ADLS

## 2022-11-23 NOTE — PROGRESS NOTES
Occupational Therapy Initial Assessment  Date: 2022   Patient Name: Gwen Barlow  MRN: [id number]     : 1965    Date of Service: 2022    Discharge Recommendations:  Patient would benefit from continued therapy after discharge       Assessment   Assessment: Evaluation completed and tx initiated. The patient would benefit from further rehab to upgrade functional independence and safety. Good potential to make significant gains. Treatment Diagnosis: Cervical Stenosis s/p C3-6 Lamininectomy and fusion  History: surgery had been planned for  but came to hospital due to progressive neurological symptoms  REQUIRES OT FOLLOW-UP: Yes  Activity Tolerance  Activity Tolerance: Patient Tolerated treatment well  Activity Tolerance Comments: despite pain complaints           Patient Diagnosis(es): The primary encounter diagnosis was Cervical myelopathy ([facility] [address] Diagnoses of Cervical spinal cord compression (HCC) and Chronic back pain, unspecified back location, unspecified back pain laterality were also pertinent to this visit. has no past medical history on file. has no past surgical history on file.     Treatment Diagnosis: Cervical Stenosis s/p C3-6 Lamininectomy and fusion      Restrictions  Restrictions/Precautions  Restrictions/Precautions: Fall Risk, ROM Restrictions, Surgical Protocols  Required Braces or Orthoses?: Yes  Required Braces or Orthoses  Cervical: c-collar  Position Activity Restriction  Spinal Precautions: No Bending, No Lifting, No Twisting  Other position/activity restrictions: cervical collar    Subjective   General  Chart Reviewed: Yes  Patient assessed for rehabilitation services?: Yes  Family / Caregiver Present: No  Pain Assessment  Pain Level: 8  Patient's Stated Pain Goal: 0 - No pain  Pain Location: Neck  Pre Treatment Pain Screening  Pain at present: 8 (neck, upper back)  Scale Used: Numeric Score  Intervention List: Nurse called to administer meds (willing to continue)  Comments / Details: 8/10 with activity  Oxygen Therapy  O2 Device: None (Room air)    Social/Functional History  Social/Functional History  Lives With: Spouse  Type of Home: House  Home Layout: One level  Home Access: Stairs to enter without rails  Entrance Stairs - Number of Steps: 3  Bathroom Shower/Tub: Tub/Shower unit  Bathroom Equipment: 3-in-1 commode, Shower chair  Home Equipment: Cane  Has the patient had two or more falls in the past year or any fall with injury in the past year?: Yes  ADL Assistance: Needs assistance  Ambulation Assistance: Independent  Active : Yes  Occupation: Self employed  Additional Comments: walking was increasingly more difficult       Objective   Vision Exceptions: Wears glasses at all times  Hearing: Within functional limits       Observation/Palpation  Posture: Good  Observation: c collar, IV (RN unhooked), frazier  Toilet Transfers  Toilet - Technique: Ambulating (with RW)  Toilet Transfer: Minimal assistance  ADL  Feeding: Supervision;Minimal assistance (uncoordinated due to decreased sensation)  Grooming: Supervision;Minimal assistance  UE Bathing: Minimal assistance; Moderate assistance  LE Bathing: Minimal assistance; Moderate assistance  UE Dressing: Supervision;Minimal assistance  LE Dressing: Minimal assistance; Moderate assistance  Toileting: Minimal assistance; Moderate assistance        Bed mobility  Supine to Sit: Minimal assistance  Transfers  Stand Step Transfers: Minimal assistance     Cognition    Cognition Comment: Requires supervision for safety and instruction on techniques.                  LUE AROM (degrees)  LUE AROM : [facility]  RUE AROM (degrees)  RUE AROM : [facility]           11/23/22 1018   Gross Assessment   Coordination   (moderate incoordination associated with decreased sensation fingers to elbows bilaterally)              Plan   Occupational Therapy Plan  Times Per Week: 3-5    Goals  Short Term Goals  Short Term Goal 1: The patient will be modified independent with dressing  Short Term Goal 2: The patient will be modified independent with toileting  Short Term Goal 3: The patient will be modified independent with bed mobility and transfers  Short Term Goal 4: Patient/family will be independent with precautions, management of cervical collar  Short Term Goal 5: The patient will be modified independent with light ambulatory ADL     Tx initiated: Training and participation light ambulatory ADL, movement strategies, collar adjustments, positioning.   (30 mins)          Radha Desai OT/L  Electronically signed by Radha Desai OT/CARLEE on 11/23/2022 at 10:24 AM.

## 2022-11-23 NOTE — OP NOTE
[facility] [employer] OF [facility] CARON [address], [address] [facility]                                OPERATIVE REPORT    PATIENT NAME: Curt Hernandez               :        1965  MED REC NO:   [id number]                              ROOM:       [facility]  ACCOUNT NO:   [de-identified]                           ADMIT DATE: 2022  PROVIDER:     Davon Drew. Karissa Reed MD, PhD    [place] Daunt:  2022    NEUROSURGICAL OPERATIVE REPORT    ATTENDING SURGEON:  Davon Drew. Karissa Reed MD, PhD    Chika Ike:  1. Cervical spondylotic myelopathy. 2.  Cervical spinal cord compression. POSTOPERATIVE DIAGNOSES:  1. Cervical spondylotic myelopathy. 2.  Cervical spinal cord compression. PROCEDURE PERFORMED:  1. C3 through C6 decompressive laminectomy. 2.  C3 through C6 posterior instrumented fusion with Medtronic Infinity  lateral mass screws and rods, local autograft and [facility] DBM. 3.  Intraoperative fluoroscopy with interpretation. 4.  Intraoperative neuromonitoring with SSEP, MEP and upper extremity  EMG. ESTIMATED BLOOD LOSS:  Approximately 100 mL. INDICATIONS:  Please see the medical record for full details. Briefly,  the patient is a 70-year-old male who presented to the emergency  department at [facility] approximately 2 weeks ago complaining of worsening  numbness in his hands, neck and back pain and balance difficulty causing  multiple falls. Fairly extensive imaging workup was done in the  emergency department including MRI scans of the cervical, thoracic and  lumbar spine. Significant finding on these imaging studies was severe  spinal stenosis at C3-C4 and C4-C5 and to a lesser degree at C5-C6 and  C6-C7 causing severe spinal stenosis, spinal cord compression and signal  change within the spinal cord.   He was referred for neurosurgical  evaluation and I evaluated him and confirmed to be grossly myelopathic  with pathologic reflexes and numbness and discoordination in his hands  and poor balance, though we did not have motor weakness of any kind  other than slightly diminished hand grasp. His imaging was reviewed  with him and he was advised and he was offered and requested that we  proceed with surgical intervention and the surgery was being planned for  11/29/2022. He subsequently developed worsening balance and numbness in  his hands, and presented to the emergency department complaining of his  worsening symptoms. He was subsequently admitted to the hospital and  the plan was made to accelerate the timeframe for surgery. The patient  consented to proceed with the above-mentioned surgery after a full PARQ  discussion. PROCEDURE IN DETAIL:  The patient was identified in the presurgical  area, consent for surgery was confirmed and the site for surgery was  identified. He was transferred to the operating room by the Anesthesia  team and electrodes for neuromonitoring were connected. Baseline  waveforms were obtained prior to flipping prone and we noted very poor  quality of the signals, SSEP and MEP likely due to his severe spinal  cord compression. However, we did appear to have usable signals for  SSEP in all extremities and bilateral motor signals in the feet. Once  we had baseline waveforms, he was then placed in the Black head  burrows and flipped into the prone position on chest rolls. All pressure  points were carefully padded. Fluoroscopy was used to identify the  operative levels and a midline incision was planned. Once the incision  had been planned, his shoulders were retracted with tape. Once  positioning was complete, we then obtained post flipped waveforms and  while stable, they were still relatively of poor quality. At this  point, the decision was made to proceed with the operation. The  patient's neck was prepped and draped in the usual aseptic fashion.   A  team pause was held according to the preformatted script, all were in  agreement and the decision was made to proceed with surgery. The planned incision was infiltrated with local anesthetic and opened  sharply in the midline. Dissection was carried down to the subcutaneous  tissues in the midline raphe until we encountered the spinous processes. A monopolar cautery and Ware elevators were used to elevate the  paraspinous musculature off the lamina and lateral masses from C3 to C6  bilaterally and self-retaining retractors were then placed. Once the  self-retaining retractors were placed, we again used fluoroscopy to  confirm the operative levels and we then decided to drill  holes  for our lateral mass screws. The lateral masses of C3, C4, C5 and C6  were exposed bilaterally. A 2 mm shiloh on the Midas Conner drill was then  used to create a  hole just medial and inferior to the midpoint of  the lateral mass. A 12 mm hand drill with a depth stop of 12 mm was  then used to cannulate the lateral masses in an upward and outward  trajectory. Once we had cannulated the lateral masses bilaterally,  ball-tip probe was used to palpate the drilled trajectory and there were  no breaches noted. Once we had the  holes for our lateral mass  screws created, we then turned our attention to the decompression. The  2 mm jason ball on the Midas Conner drill was then used to drill a trough  through the lamina just at the junction of the lamina and lateral mass  from C3 to C6 bilaterally. We then used a combination of Leksell  rongeur, Kerrison punches and the high-speed drill to complete  laminectomies beginning from C6 working rostrally and completed this  from C3 to C6. Once we had the laminectomy completed, we noticed  generous decompression of the spinal cord. We undermined the laminas of  C7 and C2 slightly, and then we were able to freely pass a [facility]  elevator underneath the C7 and C2 lamina confirming complete  decompression of the spinal cord.   We then used 2 mm Kerrison punch to  decompress the lateral recesses bilaterally and ensure the neural  foramina were decompressed. Once we had accomplished this, we irrigated  and we obtained hemostasis with Surgifoam in the lateral gutters and  turned our attention to placement of our lateral mass screws. The  self-tapping 3.5 x 12 mm Medtronic Infinity lateral mass screws were  placed at all levels along our pre-drilled trajectory and once the  screws were in place, the position was confirmed with fluoroscopy. Once  we were satisfied with position of the lateral mass screws, we then used  the matchstick shiloh on the high-speed drill to decorticate the facet   joints and lateral masses from C3 to C6 bilaterally. We then  mixed the autograft bone that had been harvested during the laminectomy  and mixed this with [place] DBM. This was mixed to morselize  consistency. This was packed around our lateral mass screws and the  facet joints bilaterally. Once the bone graft was in place, we then  aligned our polyaxial screw heads and 50 mm rods were placed  bilaterally. These were then secured in place with locking caps and  final tightened with the torque-limiting . Once all  instrumentation was in place, we obtained final AP and lateral  fluoroscopic images and we were satisfied with the position of the  instrumentation. At this point, a final irrigation step was then  performed. We then confirmed with neuromonitoring that our SSEP  waveforms were at baseline and we had actually seen some improvement in  the motor waveforms in the right upper extremity which was absent at the  beginning of the surgery. Once we were satisfied that we have not had  any significant changes to our neuromonitoring signals, we irrigated the  wound a final time. A medium Hemovac drain was left in the surgical bed  and tunneled inferiorly from the wound and we turned our attention to  closure.   The paraspinal muscles were infiltrated with Exparel  long-acting local anesthetic and then the fascia was closed with  inverted interrupted #1 Vicryl sutures. Once the fascia was closed, the  dermis was closed with inverted interrupted 2-0 Vicryl sutures and the  skin was closed with staples. Once the skin was closed, the Hemovac  drain was secured to the skin with a Vicryl stitch and connected to the  suction reservoir. A Mepilex dressing was then applied to the incision  and the drapes were removed. The patient was then removed from the  Black head burrows and flipped into supine position on a gurney and  control of the operation was returned to Anesthesia for extubation and  transported to recovery. All sponge, needle and instrument counts were  correct on two iterations and there were no apparent complications. Diego Jones MD, PhD    D: 11/22/2022 18:34:28      T: 11/22/2022 18:39:58     [email or web address]  Job#: [id number]     Doc#: [id number]    CC:

## 2022-11-23 NOTE — PROGRESS NOTES
NEUROSURGERY POSTOPERATIVE PROGRESS NOTE      Chief Complaint:   Chief Complaint   Patient presents with    Spasms     Was told to come back if spasms were worsening, is set to have to have surgery on 11/29    Numbness     Patient states that it feels \"numb\" in his abdomen         Date of Surgery: 11/22/2022    Procedure Performed: C3-6 laminectomy and fusion      Interval Update:    No events since surgery. He reports poor sleep and he is annoyed by the PCA pump. He reports a sensation of \"glass shards\" in his hands, he feels they are waking up. The spasms in his back and neck have resolved but he does report significant neck stiffness and pain. No new radicular pain or weakness. HPI:     Patient known to me from recent in-office evaluation with plan for surgery on 11/29. His myelopathic symptoms appear to be worsening. His gait and balance impairment is progressing and he has new numbness in his abdomen. Exam remains significant for bilateral Lyon's, numbness in the hands and ataxic gait. His wife is having difficulty caring for him at home. Objective:    /85   Pulse 87   Temp 98.2 °F (36.8 °C) (Temporal)   Resp 16   Ht 5' 7\" (1.702 m)   Wt 165 lb (74.8 kg)   SpO2 96%   BMI 25.84 kg/m²     HMV drain: 190 mL of sanguinous output since surgery    Physical Exam:    General: alert, cooperative, no distress, c-collar in place  Cardiorespiratory: unlabored breathing  Wound: Covered by clean Mepilex dressing      Neurologic Exam:    Mental Status: Alert, oriented, thought content appropriate  Cranial Nerves: PERRL, EOMI, symmetric facies, tongue midline  Motor: 5/5 BUE deltoid, biceps, triceps, 4/5 . 5/5 BLE HF, KE, ADF, APF  Somatosensory: Decreased to light touch in the bilateral hands and feet      Imaging:    Postop CT reviewed. Appropriate c-spine alignment, postsurgical change after C3-6 laminectomy.   Lateral mass screws from C3-C6 are well-positioned bilaterally. Assessment and Plan:    62 y.o. M now s/p C3-6 laminectomy and fusion on 11/22/2022 for progressive cervical myelopathy with severe spinal cord compression. He is doing well postoperatively.     - DC PCA, will increase oral percocet from home dose and add IV dilaudid for breakthrough  - DC IV fluids  - Keep hemovac drain, monitor output  - DC frazier catheter after he is able to mobilize  - Aggressive bowel regimen  - Mobilize with PT/OT  - Disposition will depend on pain control and his ability to mobilize with PT/OT      Electronically signed by Harish Hand MD on 11/23/2022 at 8:06 AM

## 2022-11-23 NOTE — PROGRESS NOTES
Physical Therapy  Facility/Department: [facility] SURG SERVICES  Physical Therapy Initial Assessment    Name: Dima Craig  : 1965  MRN: [id number]  Date of Service: 2022    Discharge Recommendations:  Continue to assess pending progress, 24 hour supervision or assist, Patient would benefit from continued therapy after discharge          Patient Diagnosis(es): The primary encounter diagnosis was Cervical myelopathy ([facility] [address] Diagnoses of Cervical spinal cord compression (HCC) and Chronic back pain, unspecified back location, unspecified back pain laterality were also pertinent to this visit. Past Medical History:  has no past medical history on file. Past Surgical History:  has no past surgical history on file. Assessment   Body Structures, Functions, Activity Limitations Requiring Skilled Therapeutic Intervention: Decreased functional mobility ; Decreased strength;Decreased balance;Decreased posture; Increased pain;Decreased safe awareness;Decreased cognition;Decreased ROM; Decreased body mechanics; Decreased sensation;Decreased coordination;Decreased fine motor control  Assessment: Pt. will benefit from cont. PT to decrease impairments. Pt. a fall risk and should not attempt mobility on his own at this time. Pt. needs to use gait belt, c collar, RW and staff A. Pt's knees felt a little weak, but did not buckle after mini squats. Would recommend pt have further therapy and not return home at this time. Pt. needs gait training, log roll training.   Treatment Diagnosis: impaired gait and mobility  Therapy Prognosis: Good  Decision Making: Medium Complexity  Barriers to Learning: cognition  Requires PT Follow-Up: Yes  Activity Tolerance  Activity Tolerance: Patient tolerated treatment well;Treatment limited secondary to decreased cognition     Plan   Physcial Therapy Plan  General Plan: 6-7 times per week  Current Treatment Recommendations: Strengthening, Balance training, ROM, Functional mobility training, Transfer training, Gait training, Endurance training, Safety education & training, Positioning, Equipment evaluation, education, & procurement, Patient/Caregiver education & training, Therapeutic activities  Safety Devices  Type of Devices: Call light within reach, Chair alarm in place, Left in chair, Gait belt, Nurse notified, Patient at risk for falls     Restrictions  Restrictions/Precautions  Restrictions/Precautions: Fall Risk, ROM Restrictions, Surgical Protocols  Required Braces or Orthoses?: Yes  Required Braces or Orthoses  Cervical: c-collar  Position Activity Restriction  Spinal Precautions: No Bending, No Lifting, No Twisting  Other position/activity restrictions: cervical collar     Subjective   Pain: 8/10 neck  General  Chart Reviewed: Yes  Patient assessed for rehabilitation services?: Yes  Response To Previous Treatment: Not applicable  Family / Caregiver Present: No  Referring Practitioner: Donny Salmon MD  Referral Date : 11/22/22  Diagnosis: cervical myelopathy, cervical spinal cord compression  Follows Commands: Impaired  Other (Comment): needs simple commands, repetition  General Comment  Comments: RN, Maggie Hammonds, okayed PT and gave pt pain pills while he sat on EOB. Karl RUBIN Brochure, unhooked IV. 11/22 C3-C6 decompressive lami  Subjective  Subjective: Pt. willing to walk.          Social/Functional History  Social/Functional History  Lives With: Spouse  Type of Home: House  Home Layout: One level  Home Access: Stairs to enter without rails  Entrance Stairs - Number of Steps: 3  Bathroom Shower/Tub: Tub/Shower unit  Bathroom Equipment: 3-in-1 commode, Shower chair  Home Equipment: [email or web address]  Has the patient had two or more falls in the past year or any fall with injury in the past year?: Yes  ADL Assistance: Needs assistance  Ambulation Assistance: Independent  Active : Yes  Occupation: Self employed  Additional Comments: walking was increasingly more difficult  Vision/Hearing  Vision  Vision: Impaired  Vision Exceptions: Wears glasses at all times  Hearing  Hearing: Within functional limits    Cognition   Orientation  Overall Orientation Status: Within Normal Limits  Cognition  Overall Cognitive Status: Exceptions  Arousal/Alertness: Appropriate responses to stimuli  Following Commands: Follows one step commands with repetition; Follows one step commands with increased time  Attention Span: Attends with cues to redirect  Memory: Decreased recall of precautions  Safety Judgement: Decreased awareness of need for assistance;Decreased awareness of need for safety  Problem Solving: Assistance required to generate solutions;Assistance required to implement solutions  Insights: Decreased awareness of deficits  Initiation: Requires cues for some  Sequencing: Requires cues for some  Cognition Comment: Requires supervision for safety and instruction on techniques.      Objective   Heart Rate: 82  Heart Rate Source: Monitor  BP: (!) 126/94  BP Location: Right upper arm  MAP (Calculated): 105  Resp: 16  SpO2: 96 %  O2 Device: None (Room air)     Observation/Palpation  Posture: Good  Observation: c collar, IV (RN unhooked), freddie  Gross Assessment  Sensation:  (numb B hands and up to elbows, L worse than R, B feet numb up to calves)     AROM RLE (degrees)  RLE AROM: Exceptions  RLE General AROM: 0-90 knee and hip, ankle WFLs  AROM LLE (degrees)  LLE AROM : Exceptions  LLE General AROM: 0-90 knee and hip, ankle WFLs  Strength RLE  Strength RLE: WFL  Comment: grossly 3+/5  Strength LLE  Strength LLE: WFL  Comment: grossly 3+/5           Bed mobility  Supine to Sit: Minimal assistance  Sit to Supine: Unable to assess  Bed Mobility Comments: sat on EOB x 5 mins  Transfers  Sit to Stand: Minimal Assistance  Stand to Sit: Minimal Assistance  Comment: v cues with transfers, pt's collar adjusted while seated EOB  Ambulation  Surface: Level tile  Device: Rolling Walker  Assistance: Minimal assistance;2 Person assistance  Quality of Gait: unsteady, ataxic  Gait Deviations: Slow Rachel;Decreased step length;Decreased step height  Distance: 2', 30'  Comments: able to do mini squats     Balance  Posture: Fair  Sitting - Static: +;Fair  Sitting - Dynamic: Fair;-  Standing - Static: Poor;+  Standing - Dynamic: Poor;+           OutComes Score                                                  AM-PAC Score             Tinneti Score       Goals  Short Term Goals  Time Frame for Short Term Goals: 2 wks  Short Term Goal 1: supine to sit indep  Short Term Goal 2: sit to stand indep  Short Term Goal 3: amb. 200' with RW SBA  Patient Goals   Patient Goals : go home       Education  Patient Education  Education Given To: Patient  Education Provided: Role of Therapy;Plan of Care;Transfer Training  Education Provided Comments: use of call light, staff A  Education Method: Verbal  Barriers to Learning: Cognition  Education Outcome: Verbalized understanding;Continued education needed      Therapy Time   Individual Concurrent Group Co-treatment   Time In           Time Out           Minutes                   Dwight Munoz PT   Electronically signed by Dwight Munoz PT on 11/23/2022 at 10:32 AM

## 2022-11-23 NOTE — CARE COORDINATION
[facility] HEART FAILURE      Patient Name: Nina Saez  :1975  Age: 44 y.o.  Sex: female  Referring Provider: Nilda Gallagher APRN   Primary Cardiologist: Dr. Melton  Encounter Provider: Zina Waldron, PharmD      Chief Complaint:   Chief Complaint   Patient presents with   • Congestive Heart Failure       HPI:  Patient presents for her initial appt in the HF clinic. PMH is significant for HFrEF (EF 6%), severe nonischemic cardiomyopathy, left bundle branch block, HTN, HIV, obesity, and asthma. She was recently hospitalized and discharged on 2020 for SOB. She is not a candidate for CRT due to narrow QRS and she has had an AICD placed. She has been referred to  for a possible transplant evaluation due to her severe disease. She states her appt is in early December. Per chart review, she has a hard time following a low-sodium diet. She did state today she likes cold cuts, but overall has been making good adjustments by baking chicken/fish and using seasonings like Mrs. Dash. She sleeps on 2 pillows at baseline. She does report some SOB but this is normal for her. She normally does not see lower extremity edema but usually notices abdominal distension. She does great with her medications. She was previously on a higher dose of Entresto but was unable to tolerate due to hypotension.      Current Regimen:  Heart Failure  Carvedilol 3.125 mg bid  Furosemide 80 mg bid  Entresto 24/26 mg bid      Other CV Meds  Aspirin 81 mg daily      Medication Use:  Adherence: great, uses a pillbox  Past hx of medication use/intolerance: metoprolol (dizziness, nausea); higher dose of Entresto (hypotension)  [facility]; no issues with cost      Diet:  Defer more in-depth discussion to future visit. She did state she likes cold cuts      Social History:  Tobacco use: former smoker (quit )  EtOH use: none  Illicit drug use: none  Exercise: minimal due to her disease; does walk  [facility] [address] [facility]  Notification of Admission Decision      [] Patient has been accepted for admit to [facility] on :       Please write discharge orders and summary prior to discharge. [x] Patient acceptance to Rehab pending the following : insurance approval    [] Eval in progress       [] Patient determined to be ineligible for services at [facility] because : We recommend you consider        Thank you for your referral, we appreciate you. If you have any questions, please feel   free to contact me at [phone].    Electronically Signed by Sandro Rolle, Admissions Coordinator 11/23/2022 12:05 PM "around the house and wants to walk more      Immunization Status:  Pneumococcal: PCV13 (4/2017), PPSV23 due  Influenza: gets at [facility] and plans to receive at her appt this month       OBJECTIVE:    /76 (BP Location: Left arm, Patient Position: Sitting, Cuff Size: Adult)   Pulse 102   Resp 20   Ht 167.6 cm (65.98\")   Wt 86.6 kg (191 lb)   LMP 10/27/2020   SpO2 96%   BMI 30.84 kg/m²     Body mass index is 30.84 kg/m².  Wt Readings from Last 1 Encounters:   11/13/20 86.6 kg (191 lb)       Lab Review:  Renal Function: Estimated Creatinine Clearance: 87.4 mL/min (by C-G formula based on SCr of 0.91 mg/dL).    Lab Results   Component Value Date    PROBNP 16,206.0 (H) 10/27/2020       Results for orders placed during the hospital encounter of 07/07/20   Adult Transthoracic Echo Complete W/ Cont if Necessary Per Protocol    Narrative · Calculated EF = 6.0%  · Left ventricular systolic function is severely decreased.  · The left ventricular cavity is severely dilated.  · Left ventricular wall thickness is consistent with a thin walled   ventricle.  · Left ventricular diastolic dysfunction (grade III) consistent with fixed   restricive pattern.  · Moderate-to-severe mitral valve regurgitation is present  · Moderate tricuspid valve regurgitation is present.  · Estimated right ventricular systolic pressure from tricuspid   regurgitation is moderately elevated (45-55 mmHg).  · Calculated right ventricular systolic pressure from tricuspid   regurgitation is 48 mmHg.  · The following left ventricular wall segments are hypokinetic: mid   anterior, apical anterior, mid anterolateral, apical lateral, mid   inferolateral, apical inferior, mid inferior, apical septal, mid   inferoseptal, apex hypokinetic and mid anteroseptal. The following left   ventricular wall segments are akinetic: basal anterior, basal   anterolateral, basal inferolateral, basal inferior, basal inferoseptal and   basal anteroseptal.            6 " MINUTE WALK  Refused. Will defer to future visit        ASSESSMENT/PLAN OF CARE:    1. HFrEF (EF 6%)  - Patient's symptoms are currently stable after recent discharge from hospital  - Start Jardiance 10 mg daily. Reviewed MOA/dosing/side effects/contraindications  - Continue carvedilol 3.125 mg twice daily  - Continue Entresto 24/26 mg twice daily  - Continue furosemide 80 mg twice daily  - She is unable to tolerate higher doses of carvedilol and Entresto due to hypotension  - Unlikely a candidate for spironolactone due to her history of hypotension  - Briefly reviewed diet recommendations including limiting sodium intake to <2000 mg/day. Discussed reading nutrition labels and reviewed the [place].  - Advised patient to call clinic with 2-3 lb weight gain in 24 hrs or >5 lb weight gain in 1 week  - Advised patient she is due for PPSV23 and flu vaccine. Patient reports she plans to obtain at [facility] clinic at her appt this month  - Patient reports her appt with  is scheduled for the beginning of December 20 min spent in direct patient care      Thank you for allowing me to participate in the care of your patient,         Zina Waldron [facility] HEART FAILURE  11/13/20  14:10 EST

## 2022-11-23 NOTE — PROGRESS NOTES
[id number] [facility]      Patient:  Leora Freedman  YOB: 1965  Date of Service: 11/23/2022  MRN: [id number]   Acct: [de-identified]   Primary Care Physician: Santa Burk DO  Advance Directive: Full Code  Admit Date: 11/20/2022       Hospital Day: 3  Portions of this note have been copied forward, however, changed to reflect the most current clinical status of this patient. CHIEF COMPLAINT back spasms and numbness    SUBJECTIVE: Patient reports continued pain postoperatively. Patient reports feeling of glass in his left hand. Patient reports poor sleep overnight last night. Patient denies nausea, vomiting, and diarrhea. CUMULATIVE HOSPITAL COURSE:  Patient is a 80-year-old male with past medical history of tobacco abuse and cervical spinal cord compression who presented to [facility] ED with complaints of worsening neurological symptoms. Patient reported being seen in the outpatient setting by neurosurgery with recommendation for a C3-6 laminectomy with fusion scheduled for 11/29/2022. Patient states he was told to go to the ED for any worsening symptoms. Patient reported having worsening bilateral upper extremity weakness and numbness. Patient also reported having numbness while of the abdomen. Patient reports having more difficulty ambulating. Patient denies issues with bowel and bladder control. Patient was admitted to the hospitalist service with neurosurgery consultation. CT cervical spine without contrast indicated straightening of the normal cervical lordosis, grade 1 anterolisthesis of C2 on C3, C7 on T1, and T2 on T3, grade 1 retrolisthesis of C3 on C4, multilevel advanced degenerative changes of the cervical spine causing severe spinal canal stenosis at C4 C3, varying degrees of neuroforaminal stenosis including severe neuroforaminal stenosis at multiple levels. Due to progression of symptoms neurosurgery recommended proceeding with surgical intervention.   Patient underwent C3-6 decompression laminectomy and posterior fusion on 11/22/2022 with Dr. Carlos Suarez. Pain medications adjusted by neurosurgery for better pain control. Aggressive bowel regimen initiated. Review of Systems:   Review of Systems   Constitutional:  Negative for appetite change, chills, fatigue and fever. HENT:  Negative for sore throat and trouble swallowing. Respiratory:  Negative for chest tightness and shortness of breath. Cardiovascular:  Negative for chest pain, palpitations and leg swelling. Gastrointestinal:  Negative for abdominal pain, constipation, diarrhea, nausea and vomiting. Genitourinary:  Negative for difficulty urinating, dysuria, flank pain, frequency and urgency. Musculoskeletal:  Positive for back pain. Negative for neck pain. Back spasms   Skin:  Negative for color change and wound. Neurological:  Positive for weakness and numbness. Negative for headaches. Psychiatric/Behavioral:  Negative for sleep disturbance. The patient is nervous/anxious. 14 point review of systems is negative except as specifically addressed above. Objective:   VITALS:  BP (!) 126/94   Pulse 82   Temp 98.4 °F (36.9 °C) (Temporal)   Resp 16   Ht 5' 7\" (1.702 m)   Wt 165 lb (74.8 kg)   SpO2 96%   BMI 25.84 kg/m²   24HR INTAKE/OUTPUT:    Intake/Output Summary (Last 24 hours) at 11/23/2022 0923  Last data filed at 11/23/2022 0424  Gross per 24 hour   Intake 2747.37 ml   Output 1490 ml   Net 1257.37 ml       Physical Exam  Vitals reviewed. Constitutional:       Appearance: He is not ill-appearing. HENT:      Mouth/Throat:      Mouth: Mucous membranes are moist.      Pharynx: Oropharynx is clear. Eyes:      Pupils: Pupils are equal, round, and reactive to light. Neck:      Comments: Hard cervical collar in place, Hemovac drain with bloody drainage noted, incision site not visualized for hard collar. Cardiovascular:      Rate and Rhythm: Normal rate and regular rhythm.       Pulses: Normal pulses. Heart sounds: Normal heart sounds. Pulmonary:      Effort: Pulmonary effort is normal.      Breath sounds: Normal breath sounds. Abdominal:      General: Abdomen is flat. Bowel sounds are normal. There is no distension. Palpations: Abdomen is soft. Tenderness: There is no abdominal tenderness. There is no guarding. Musculoskeletal:      Cervical back: No tenderness. Right lower leg: No edema. Left lower leg: No edema. Skin:     General: Skin is warm and dry. Capillary Refill: Capillary refill takes less than 2 seconds. Neurological:      Mental Status: He is alert and oriented to person, place, and time. Sensory: Sensory deficit present. Motor: Weakness present. Gait: Gait abnormal.           Medications:      sodium chloride        sennosides-docusate sodium  2 tablet Oral BID    polyethylene glycol  17 g Oral Daily    nicotine  1 patch TransDERmal Daily    gabapentin  600 mg Oral 5x Daily    sodium chloride flush  5-40 mL IntraVENous 2 times per day    famotidine  20 mg Oral BID     oxyCODONE-acetaminophen, HYDROmorphone **OR** HYDROmorphone, bisacodyl, oxyCODONE-acetaminophen, tiZANidine, sodium chloride flush, sodium chloride, potassium chloride, promethazine **OR** ondansetron, acetaminophen **OR** acetaminophen  ADULT DIET; Regular     Lab and other Data:     Recent Labs     11/21/22  0143 11/22/22  0146 11/23/22  0147   WBC 9.6 7.4 8.2   HGB 12.1* 11.9* 11.4*    167 161     Recent Labs     11/21/22  0143 11/22/22 0146 11/23/22  0147    141 137   K 4.4 3.8 4.6    106 103   CO2 21* 23 26   BUN 24* 26* 21*   CREATININE 0.7 0.7 0.6   GLUCOSE 122* 147* 157*     Recent Labs     11/20/22  1045   AST 26   ALT 34   BILITOT 0.5   ALKPHOS 72     Troponin T: No results for input(s): TROPONINI in the last 72 hours. Pro-BNP: No results for input(s): BNP in the last 72 hours.   INR:   Recent Labs     11/20/22  1045   INR 0.95     UA:No results for input(s): NITRITE, COLORU, PHUR, LABCAST, WBCUA, RBCUA, MUCUS, TRICHOMONAS, YEAST, BACTERIA, CLARITYU, SPECGRAV, LEUKOCYTESUR, UROBILINOGEN, BILIRUBINUR, BLOODU, GLUCOSEU, AMORPHOUS in the last 72 hours. Invalid input(s): Beatrice Addison  A1C: No results for input(s): LABA1C in the last 72 hours. ABG:No results for input(s): PHART, CWJ4IIK, PO2ART, HFN2BGG, BEART, HGBAE, F0BGNFNK, CARBOXHGBART in the last 72 hours. RAD:   XR CERVICAL SPINE (2-3 VIEWS)    Result Date: 11/23/2022  Impression: Cervical spondylotic changes on preoperative radiograph. Postoperative multiple laminectomy defects and metallic fixation implant in place. Recommendation: Follow up as clinical indicated. Electronically Signed by Lc Kirk MD at 23-Nov-2022 03:31:18 AM EST             CT CERVICAL SPINE WO CONTRAST    Result Date: 11/21/2022  Impression: 1. No acute osseous abnormality of the cervical spine. 2.Straightening of the normal cervical lordosis. 3.Grade 1 anterolisthesis of C2 on C3, C7 on T1 and T2 on T3. 4.Grade 1 retrolisthesis of C3 on C4. 4.Multilevel advanced degenerative changes of the cervical spine, causing severe spinal canal stenosis at C3-C4. 5.Varying degrees of neuroforaminal stenosis, including severe neuroforaminal stenosis at multiple levels.        Assessment/Plan   Principal Problem:    Back spasm / Cervical myelopathy (HCC) / Cervical spinal cord compression (HCC)              -neurosurgery on board                          -Postop day 1 C3-6 laminectomy and fusion              -Pain regimen per neurosurgery              -PT/OT               -fall precautions              -bed alarm              -neurovascular checks   -Inpatient rehab consultation   -Aggressive bowel regimen     Active Problems:    Spondylolisthesis of lumbosacral region              -noted, continue current pain management regimen        Tobacco use              -nicoderm patch              -smoking cessation brendan Quezada, CARYN - CNP, 11/23/2022 9:23 AM

## 2022-11-24 LAB
ANION GAP SERPL CALCULATED.3IONS-SCNC: 8 MMOL/L (ref 7–19)
BASOPHILS ABSOLUTE: 0 K/UL (ref 0–0.2)
BASOPHILS RELATIVE PERCENT: 0.3 % (ref 0–1)
BUN BLDV-MCNC: 21 MG/DL (ref 6–20)
CALCIUM SERPL-MCNC: 8.5 MG/DL (ref 8.6–10)
CHLORIDE BLD-SCNC: 103 MMOL/L (ref 98–111)
CO2: 26 MMOL/L (ref 22–29)
CREAT SERPL-MCNC: 0.5 MG/DL (ref 0.5–1.2)
EOSINOPHILS ABSOLUTE: 0 K/UL (ref 0–0.6)
EOSINOPHILS RELATIVE PERCENT: 0.5 % (ref 0–5)
FERRITIN: 122 NG/ML (ref 30–400)
FOLATE: 6.9 NG/ML (ref 4.5–32.2)
GFR SERPL CREATININE-BSD FRML MDRD: >60 ML/MIN/{1.73_M2}
GLUCOSE BLD-MCNC: 109 MG/DL (ref 74–109)
HCT VFR BLD CALC: 34.1 % (ref 42–52)
HEMOGLOBIN: 11.3 G/DL (ref 14–18)
IMMATURE GRANULOCYTES #: 0 K/UL
IRON SATURATION: 17 % (ref 14–50)
IRON: 49 UG/DL (ref 59–158)
LYMPHOCYTES ABSOLUTE: 2.7 K/UL (ref 1.1–4.5)
LYMPHOCYTES RELATIVE PERCENT: 30.1 % (ref 20–40)
MAGNESIUM: 2 MG/DL (ref 1.6–2.6)
MCH RBC QN AUTO: 31.4 PG (ref 27–31)
MCHC RBC AUTO-ENTMCNC: 33.1 G/DL (ref 33–37)
MCV RBC AUTO: 94.7 FL (ref 80–94)
MONOCYTES ABSOLUTE: 0.8 K/UL (ref 0–0.9)
MONOCYTES RELATIVE PERCENT: 8.5 % (ref 0–10)
NEUTROPHILS ABSOLUTE: 5.3 K/UL (ref 1.5–7.5)
NEUTROPHILS RELATIVE PERCENT: 60.3 % (ref 50–65)
PDW BLD-RTO: 14.3 % (ref 11.5–14.5)
PLATELET # BLD: 143 K/UL (ref 130–400)
PMV BLD AUTO: 10.7 FL (ref 9.4–12.4)
POTASSIUM REFLEX MAGNESIUM: 4.2 MMOL/L (ref 3.5–5)
PRO-BNP: 101 PG/ML (ref 0–900)
RBC # BLD: 3.6 M/UL (ref 4.7–6.1)
SODIUM BLD-SCNC: 137 MMOL/L (ref 136–145)
TOTAL IRON BINDING CAPACITY: 288 UG/DL (ref 250–400)
TSH SERPL DL<=0.05 MIU/L-ACNC: 3.09 UIU/ML (ref 0.27–4.2)
VITAMIN B-12: 538 PG/ML (ref 211–946)
VITAMIN D 25-HYDROXY: 28.4 NG/ML
WBC # BLD: 8.9 K/UL (ref 4.8–10.8)

## 2022-11-24 PROCEDURE — 83735 ASSAY OF MAGNESIUM: CPT

## 2022-11-24 PROCEDURE — 82607 VITAMIN B-12: CPT

## 2022-11-24 PROCEDURE — 6370000000 HC RX 637 (ALT 250 FOR IP): Performed by: NURSE PRACTITIONER

## 2022-11-24 PROCEDURE — 83540 ASSAY OF IRON: CPT

## 2022-11-24 PROCEDURE — 84443 ASSAY THYROID STIM HORMONE: CPT

## 2022-11-24 PROCEDURE — 1210000000 HC MED SURG R&B

## 2022-11-24 PROCEDURE — 94760 N-INVAS EAR/PLS OXIMETRY 1: CPT

## 2022-11-24 PROCEDURE — 82306 VITAMIN D 25 HYDROXY: CPT

## 2022-11-24 PROCEDURE — 6360000002 HC RX W HCPCS: Performed by: NEUROLOGICAL SURGERY

## 2022-11-24 PROCEDURE — 6370000000 HC RX 637 (ALT 250 FOR IP): Performed by: NEUROLOGICAL SURGERY

## 2022-11-24 PROCEDURE — 85025 COMPLETE CBC W/AUTO DIFF WBC: CPT

## 2022-11-24 PROCEDURE — 99024 POSTOP FOLLOW-UP VISIT: CPT | Performed by: NEUROLOGICAL SURGERY

## 2022-11-24 PROCEDURE — 83550 IRON BINDING TEST: CPT

## 2022-11-24 PROCEDURE — 82746 ASSAY OF FOLIC ACID SERUM: CPT

## 2022-11-24 PROCEDURE — 82728 ASSAY OF FERRITIN: CPT

## 2022-11-24 PROCEDURE — 80048 BASIC METABOLIC PNL TOTAL CA: CPT

## 2022-11-24 PROCEDURE — 6370000000 HC RX 637 (ALT 250 FOR IP): Performed by: HOSPITALIST

## 2022-11-24 PROCEDURE — 36415 COLL VENOUS BLD VENIPUNCTURE: CPT

## 2022-11-24 PROCEDURE — 2580000003 HC RX 258: Performed by: NEUROLOGICAL SURGERY

## 2022-11-24 PROCEDURE — 83880 ASSAY OF NATRIURETIC PEPTIDE: CPT

## 2022-11-24 RX ORDER — UBIDECARENONE 75 MG
50 CAPSULE ORAL DAILY
Status: DISCONTINUED | OUTPATIENT
Start: 2022-11-24 | End: 2022-11-28 | Stop reason: HOSPADM

## 2022-11-24 RX ORDER — FOLIC ACID 1 MG/1
1 TABLET ORAL DAILY
Status: DISCONTINUED | OUTPATIENT
Start: 2022-11-24 | End: 2022-11-28 | Stop reason: HOSPADM

## 2022-11-24 RX ORDER — FERROUS SULFATE 325(65) MG
325 TABLET ORAL
Status: DISCONTINUED | OUTPATIENT
Start: 2022-11-24 | End: 2022-11-28 | Stop reason: HOSPADM

## 2022-11-24 RX ADMIN — OXYCODONE AND ACETAMINOPHEN 2 TABLET: 10; 325 TABLET ORAL at 07:25

## 2022-11-24 RX ADMIN — Medication 5 MG: at 20:27

## 2022-11-24 RX ADMIN — SENNOSIDES AND DOCUSATE SODIUM 2 TABLET: 50; 8.6 TABLET ORAL at 20:27

## 2022-11-24 RX ADMIN — GABAPENTIN 600 MG: 600 TABLET, FILM COATED ORAL at 17:45

## 2022-11-24 RX ADMIN — TIZANIDINE 4 MG: 4 TABLET ORAL at 06:46

## 2022-11-24 RX ADMIN — HYDROMORPHONE HYDROCHLORIDE 0.5 MG: 1 INJECTION, SOLUTION INTRAMUSCULAR; INTRAVENOUS; SUBCUTANEOUS at 16:37

## 2022-11-24 RX ADMIN — NALOXEGOL OXALATE 12.5 MG: 12.5 TABLET, FILM COATED ORAL at 06:46

## 2022-11-24 RX ADMIN — SODIUM CHLORIDE, PRESERVATIVE FREE 10 ML: 5 INJECTION INTRAVENOUS at 20:27

## 2022-11-24 RX ADMIN — FERROUS SULFATE TAB 325 MG (65 MG ELEMENTAL FE) 325 MG: 325 (65 FE) TAB at 09:38

## 2022-11-24 RX ADMIN — OXYCODONE AND ACETAMINOPHEN 2 TABLET: 10; 325 TABLET ORAL at 15:21

## 2022-11-24 RX ADMIN — Medication 50 MCG: at 09:38

## 2022-11-24 RX ADMIN — SENNOSIDES AND DOCUSATE SODIUM 2 TABLET: 50; 8.6 TABLET ORAL at 09:38

## 2022-11-24 RX ADMIN — SODIUM CHLORIDE, PRESERVATIVE FREE 5 ML: 5 INJECTION INTRAVENOUS at 09:38

## 2022-11-24 RX ADMIN — OXYCODONE AND ACETAMINOPHEN 2 TABLET: 10; 325 TABLET ORAL at 19:47

## 2022-11-24 RX ADMIN — FAMOTIDINE 20 MG: 20 TABLET ORAL at 20:28

## 2022-11-24 RX ADMIN — TIZANIDINE 4 MG: 4 TABLET ORAL at 21:41

## 2022-11-24 RX ADMIN — OXYCODONE AND ACETAMINOPHEN 2 TABLET: 10; 325 TABLET ORAL at 03:05

## 2022-11-24 RX ADMIN — OXYCODONE AND ACETAMINOPHEN 2 TABLET: 10; 325 TABLET ORAL at 11:24

## 2022-11-24 RX ADMIN — FOLIC ACID 1 MG: 1 TABLET ORAL at 09:34

## 2022-11-24 RX ADMIN — HYDROMORPHONE HYDROCHLORIDE 0.5 MG: 1 INJECTION, SOLUTION INTRAMUSCULAR; INTRAVENOUS; SUBCUTANEOUS at 12:22

## 2022-11-24 RX ADMIN — GABAPENTIN 600 MG: 600 TABLET, FILM COATED ORAL at 09:34

## 2022-11-24 RX ADMIN — HYDROMORPHONE HYDROCHLORIDE 0.5 MG: 1 INJECTION, SOLUTION INTRAMUSCULAR; INTRAVENOUS; SUBCUTANEOUS at 21:41

## 2022-11-24 RX ADMIN — FAMOTIDINE 20 MG: 20 TABLET ORAL at 07:25

## 2022-11-24 RX ADMIN — GABAPENTIN 600 MG: 600 TABLET, FILM COATED ORAL at 14:10

## 2022-11-24 RX ADMIN — POLYETHYLENE GLYCOL 3350 17 G: 17 POWDER, FOR SOLUTION ORAL at 07:26

## 2022-11-24 RX ADMIN — GABAPENTIN 600 MG: 600 TABLET, FILM COATED ORAL at 20:28

## 2022-11-24 RX ADMIN — GABAPENTIN 600 MG: 600 TABLET, FILM COATED ORAL at 06:46

## 2022-11-24 ASSESSMENT — PAIN DESCRIPTION - LOCATION
LOCATION: BACK;NECK
LOCATION: NECK
LOCATION: SHOULDER;NECK
LOCATION: BACK;HEAD
LOCATION: NECK;SHOULDER;HEAD
LOCATION: BACK;NECK

## 2022-11-24 ASSESSMENT — ENCOUNTER SYMPTOMS
TROUBLE SWALLOWING: 0
DIARRHEA: 0
ABDOMINAL PAIN: 0
BACK PAIN: 1
SORE THROAT: 0
COLOR CHANGE: 0
CHEST TIGHTNESS: 0
SHORTNESS OF BREATH: 0
VOMITING: 0
CONSTIPATION: 0
NAUSEA: 0

## 2022-11-24 ASSESSMENT — PAIN DESCRIPTION - DESCRIPTORS
DESCRIPTORS: ACHING;SHARP;STABBING
DESCRIPTORS: ACHING;DISCOMFORT;HEAVINESS
DESCRIPTORS: ACHING;SHARP;SHOOTING
DESCRIPTORS: ACHING;SHARP;STABBING
DESCRIPTORS: ACHING;SHARP;SHOOTING
DESCRIPTORS: BURNING;SHARP;SHOOTING

## 2022-11-24 ASSESSMENT — PAIN SCALES - GENERAL
PAINLEVEL_OUTOF10: 10
PAINLEVEL_OUTOF10: 3
PAINLEVEL_OUTOF10: 10
PAINLEVEL_OUTOF10: 9
PAINLEVEL_OUTOF10: 9
PAINLEVEL_OUTOF10: 10
PAINLEVEL_OUTOF10: 9
PAINLEVEL_OUTOF10: 9
PAINLEVEL_OUTOF10: 8

## 2022-11-24 ASSESSMENT — PAIN - FUNCTIONAL ASSESSMENT
PAIN_FUNCTIONAL_ASSESSMENT: ACTIVITIES ARE NOT PREVENTED
PAIN_FUNCTIONAL_ASSESSMENT: PREVENTS OR INTERFERES SOME ACTIVE ACTIVITIES AND ADLS

## 2022-11-24 ASSESSMENT — PAIN DESCRIPTION - ORIENTATION: ORIENTATION: RIGHT;LEFT;POSTERIOR

## 2022-11-24 NOTE — PROGRESS NOTES
[facility] Hospital[facility]      Patient:  Cranston Runner  YOB: 1965  Date of Service: 11/24/2022  MRN: [id number]   Acct: [de-identified]   Primary Care Physician: Elly Meyer DO  Advance Directive: Full Code  Admit Date: 11/20/2022       Hospital Day: 4  Portions of this note have been copied forward, however, changed to reflect the most current clinical status of this patient. CHIEF COMPLAINT back spasms and numbness    SUBJECTIVE: Patient reports sensation and strength are improving in bilateral upper extremities. Patient reports less spasms overnight last night. Patient continues to have poor sleep. Patient denies nausea, vomiting, and diarrhea. Patient reports no BM since surgery. CUMULATIVE HOSPITAL COURSE:  Patient is a 55-year-old male with past medical history of tobacco abuse and cervical spinal cord compression who presented to [address] [place] [address] ED with complaints of worsening neurological symptoms. Patient reported being seen in the outpatient setting by neurosurgery with recommendation for a C3-6 laminectomy with fusion scheduled for 11/29/2022. Patient states he was told to go to the ED for any worsening symptoms. Patient reported having worsening bilateral upper extremity weakness and numbness. Patient also reported having numbness while of the abdomen. Patient reports having more difficulty ambulating. Patient denies issues with bowel and bladder control. Patient was admitted to the hospitalist service with neurosurgery consultation. CT cervical spine without contrast indicated straightening of the normal cervical lordosis, grade 1 anterolisthesis of C2 on C3, C7 on T1, and T2 on T3, grade 1 retrolisthesis of C3 on C4, multilevel advanced degenerative changes of the cervical spine causing severe spinal canal stenosis at C4 C3, varying degrees of neuroforaminal stenosis including severe neuroforaminal stenosis at multiple levels.   Due to progression of symptoms neurosurgery recommended proceeding with surgical intervention. Patient underwent C3-6 decompression laminectomy and posterior fusion on 11/22/2022 with Dr. Karissa Reed. Pain medications adjusted by neurosurgery for better pain control. Neurosurgery recommended Hemovac to gravity instead of suction today. Aggressive bowel regimen initiated, Movantik added to bowel regimen today. We will continue with PT/OT evaluations for discharge planning, may require inpatient rehab before discharge. Review of Systems:   Review of Systems   Constitutional:  Negative for appetite change, chills, fatigue and fever. HENT:  Negative for sore throat and trouble swallowing. Respiratory:  Negative for chest tightness and shortness of breath. Cardiovascular:  Negative for chest pain, palpitations and leg swelling. Gastrointestinal:  Negative for abdominal pain, constipation, diarrhea, nausea and vomiting. Genitourinary:  Negative for difficulty urinating, dysuria, flank pain, frequency and urgency. Musculoskeletal:  Positive for back pain. Negative for neck pain. Back spasms   Skin:  Negative for color change and wound. Neurological:  Positive for weakness and numbness. Negative for headaches. Psychiatric/Behavioral:  Negative for sleep disturbance. The patient is nervous/anxious. 14 point review of systems is negative except as specifically addressed above. Objective:   VITALS:  BP (!) 118/93   Pulse 80   Temp 98.4 °F (36.9 °C)   Resp 18   Ht 5' 7\" (1.702 m)   Wt 165 lb (74.8 kg)   SpO2 93%   BMI 25.84 kg/m²   24HR INTAKE/OUTPUT:    Intake/Output Summary (Last 24 hours) at 11/24/2022 1102  Last data filed at 11/24/2022 0925  Gross per 24 hour   Intake 240 ml   Output 1025 ml   Net -785 ml         Physical Exam  Vitals reviewed. Constitutional:       Appearance: He is not ill-appearing. HENT:      Mouth/Throat:      Mouth: Mucous membranes are moist.      Pharynx: Oropharynx is clear.    Eyes:      Pupils: Pupils are equal, round, and reactive to light. Neck:      Comments: Hard cervical collar in place, Hemovac drain with bloody drainage noted, incision site not visualized for hard collar. Cardiovascular:      Rate and Rhythm: Normal rate and regular rhythm. Pulses: Normal pulses. Heart sounds: Normal heart sounds. Pulmonary:      Effort: Pulmonary effort is normal.      Breath sounds: Normal breath sounds. Abdominal:      General: Abdomen is flat. Bowel sounds are normal. There is no distension. Palpations: Abdomen is soft. Tenderness: There is no abdominal tenderness. There is no guarding. Musculoskeletal:      Cervical back: No tenderness. Right lower leg: No edema. Left lower leg: No edema. Skin:     General: Skin is warm and dry. Capillary Refill: Capillary refill takes less than 2 seconds. Neurological:      Mental Status: He is alert and oriented to person, place, and time. Sensory: Sensory deficit present. Motor: Weakness present. Gait: Gait abnormal.           Medications:      sodium chloride        ferrous sulfate  325 mg Oral Daily with breakfast    folic acid  1 mg Oral Daily    vitamin B-12  50 mcg Oral Daily    sennosides-docusate sodium  2 tablet Oral BID    polyethylene glycol  17 g Oral Daily    melatonin  5 mg Oral Nightly    naloxegol  12.5 mg Oral QAM AC    nicotine  1 patch TransDERmal Daily    gabapentin  600 mg Oral 5x Daily    sodium chloride flush  5-40 mL IntraVENous 2 times per day    famotidine  20 mg Oral BID     oxyCODONE-acetaminophen, HYDROmorphone **OR** HYDROmorphone, bisacodyl, oxyCODONE-acetaminophen, traZODone, tiZANidine, sodium chloride flush, sodium chloride, potassium chloride, promethazine **OR** ondansetron, acetaminophen **OR** acetaminophen  ADULT DIET; Regular;  No Caffeine     Lab and other Data:     Recent Labs     11/22/22  0146 11/23/22  0147 11/23/22  2356   WBC 7.4 8.2 8.9   HGB 11.9* 11.4* 11.3*    161 143       Recent Labs     11/22/22  0146 11/23/22  0147 11/23/22  2356    137 137   K 3.8 4.6 4.2    103 103   CO2 23 26 26   BUN 26* 21* 21*   CREATININE 0.7 0.6 0.5   GLUCOSE 147* 157* 109       No results for input(s): AST, ALT, ALB, BILITOT, ALKPHOS in the last 72 hours. Troponin T: No results for input(s): TROPONINI in the last 72 hours. Pro-BNP: No results for input(s): BNP in the last 72 hours. INR:   No results for input(s): INR in the last 72 hours. UA:No results for input(s): NITRITE, COLORU, PHUR, LABCAST, WBCUA, RBCUA, MUCUS, TRICHOMONAS, YEAST, BACTERIA, CLARITYU, SPECGRAV, LEUKOCYTESUR, UROBILINOGEN, BILIRUBINUR, BLOODU, GLUCOSEU, AMORPHOUS in the last 72 hours. Invalid input(s): Beatrice Addison  A1C: No results for input(s): LABA1C in the last 72 hours. ABG:No results for input(s): PHART, SNW4YRH, PO2ART, ZVG0BAO, BEART, HGBAE, [id number], CARBOXHGBART in the last 72 hours. RAD:   XR CERVICAL SPINE (2-3 VIEWS)    Result Date: 11/23/2022  Impression: Cervical spondylotic changes on preoperative radiograph. Postoperative multiple laminectomy defects and metallic fixation implant in place. Recommendation: Follow up as clinical indicated. Electronically Signed by Lc Kirk MD at 23-Nov-2022 03:31:18 AM EST             CT CERVICAL SPINE WO CONTRAST    Result Date: 11/21/2022  Impression: 1. No acute osseous abnormality of the cervical spine. 2.Straightening of the normal cervical lordosis. 3.Grade 1 anterolisthesis of C2 on C3, C7 on T1 and T2 on T3. 4.Grade 1 retrolisthesis of C3 on C4. 4.Multilevel advanced degenerative changes of the cervical spine, causing severe spinal canal stenosis at C3-C4. 5.Varying degrees of neuroforaminal stenosis, including severe neuroforaminal stenosis at multiple levels.        Assessment/Plan   Principal Problem:    Back spasm / Cervical myelopathy (HCC) / Cervical spinal cord compression ([address]              -neurosurgery on board -Postop day 2, C3-6 laminectomy and fusion              -Pain regimen per neurosurgery              -PT/OT               -fall precautions              -bed alarm              -neurovascular checks   -Inpatient rehab consultation   -Aggressive bowel regimen, Movantik added     Active Problems:    Spondylolisthesis of lumbosacral region              -noted, continue current pain management regimen        Tobacco use              -nicoderm patch              -smoking cessation education    CARYN Santiago - CNP, 11/24/2022 11:02 AM

## 2022-11-24 NOTE — PLAN OF CARE
Problem: Discharge Planning  Goal: Discharge to home or other facility with appropriate resources  11/24/2022 0904 by Raffy Saxena RN  Outcome: Progressing  11/23/2022 2224 by Rosita Hansen RN  Outcome: Progressing  Flowsheets (Taken 11/23/2022 2033)  Discharge to home or other facility with appropriate resources:   Identify barriers to discharge with patient and caregiver   Arrange for needed discharge resources and transportation as appropriate   Identify discharge learning needs (meds, wound care, etc)   Refer to discharge planning if patient needs post-hospital services based on physician order or complex needs related to functional status, cognitive ability or social support system     Problem: Safety - Adult  Goal: Free from fall injury  11/24/2022 0904 by Raffy Saxena RN  Outcome: Progressing  11/23/2022 2224 by Rosita Hansen RN  Outcome: Progressing  Flowsheets (Taken 11/23/2022 2223)  Free From Fall Injury: Instruct family/caregiver on patient safety     Problem: ABCDS Injury Assessment  Goal: Absence of physical injury  11/24/2022 0904 by Raffy Saxena RN  Outcome: Progressing  11/23/2022 2224 by Rosita Hansen RN  Outcome: Progressing  Flowsheets (Taken 11/23/2022 2223)  Absence of Physical Injury: Implement safety measures based on patient assessment     Problem: Wound:  Goal: Will show signs of wound healing; wound closure and no evidence of infection  Description: Will show signs of wound healing; wound closure and no evidence of infection  11/24/2022 0904 by Raffy Saxena RN  Outcome: Progressing  11/23/2022 2224 by Rosita Hansen RN  Outcome: Progressing     Problem: Smoking cessation:  Goal: Ability to formulate a plan to maintain a tobacco-free life will be supported  Description: Ability to formulate a plan to maintain a tobacco-free life will be supported  11/24/2022 0904 by Raffy Saxena RN  Outcome: Progressing  11/23/2022 2224 by Rosita Hansen RN  Outcome: Progressing     Problem: Neurosensory - Adult  Goal: Achieves stable or improved neurological status  11/24/2022 0904 by Nemo Perea RN  Outcome: Progressing  11/23/2022 2224 by Ilana Loyola RN  Outcome: Progressing  Flowsheets (Taken 11/23/2022 2033)  Achieves stable or improved neurological status: Assess for and report changes in neurological status  Goal: Absence of seizures  11/24/2022 0904 by Nemo Perea RN  Outcome: Progressing  11/23/2022 2224 by Ilana Loyola RN  Outcome: Progressing  Goal: Remains free of injury related to seizures activity  11/24/2022 0904 by Nemo Perea RN  Outcome: Progressing  11/23/2022 2224 by Ilana Loyola RN  Outcome: Progressing  Goal: Achieves maximal functionality and self care  11/24/2022 0904 by Nemo Perea RN  Outcome: Progressing  11/23/2022 2224 by Ilana Loyola RN  Outcome: Progressing     Problem: Musculoskeletal - Adult  Goal: Return mobility to safest level of function  11/24/2022 0904 by Nemo Perea RN  Outcome: Progressing  11/23/2022 2224 by Ilana Loyola RN  Outcome: Progressing  Flowsheets (Taken 11/23/2022 2033)  Return Mobility to Safest Level of Function: Assess patient stability and activity tolerance for standing, transferring and ambulating with or without assistive devices  Goal: Maintain proper alignment of affected body part  11/24/2022 0904 by Nemo Perea RN  Outcome: Progressing  11/23/2022 2224 by Ilana Loyola RN  Outcome: Progressing  Goal: Return ADL status to a safe level of function  11/24/2022 0904 by Nemo Perea RN  Outcome: Progressing  11/23/2022 2224 by Ilana Loyola RN  Outcome: Progressing

## 2022-11-24 NOTE — PROGRESS NOTES
NEUROSURGERY POSTOPERATIVE PROGRESS NOTE      Chief Complaint:   Chief Complaint   Patient presents with    Spasms     Was told to come back if spasms were worsening, is set to have to have surgery on 11/29    Numbness     Patient states that it feels \"numb\" in his abdomen         Date of Surgery: 11/22/2022    Procedure Performed: C3-6 laminectomy and fusion      Interval Update:    No overnight events. He reports the expected postoperative neck pain. He was able walk a short distance with PT yesterday using a walker. He reports a significant improvement in the neck and back spasms he was experiencing prior to surgery. He feels he has more sensation in his hands but they are still somewhat numb. His frazier was removed yesterday and he has been voiding. HPI:     Patient known to me from recent in-office evaluation with plan for surgery on 11/29. His myelopathic symptoms appear to be worsening. His gait and balance impairment is progressing and he has new numbness in his abdomen. Exam remains significant for bilateral Lyon's, numbness in the hands and ataxic gait. His wife is having difficulty caring for him at home. Objective:    BP (!) 118/93   Pulse 80   Temp 98.4 °F (36.9 °C)   Resp 18   Ht 5' 7\" (1.702 m)   Wt 165 lb (74.8 kg)   SpO2 93%   BMI 25.84 kg/m²     HMV drain: 200 mL x 24h    Physical Exam:    General: alert, cooperative, no distress, c-collar in place  Cardiorespiratory: unlabored breathing  Wound: Covered by clean Mepilex dressing      Neurologic Exam:    Mental Status: Alert, oriented, thought content appropriate  Cranial Nerves: PERRL, EOMI, symmetric facies, tongue midline  Motor: 5/5 BUE deltoid, biceps, triceps, 4/5 .   5/5 BLE HF, KE, ADF, APF  Somatosensory: Decreased to light touch in the bilateral hands and feet                Assessment and Plan:    62 y.o. M now s/p C3-6 laminectomy and fusion on 11/22/2022 for progressive cervical myelopathy with severe spinal cord compression. He is doing well postoperatively.     - Continue current regimen of pain meds  - Keep hemovac drain, will set to gravity without suction  - Aggressive bowel regimen  - Mobilize with PT/OT  - Disposition will depend on pain control and his ability to mobilize with PT/OT      Electronically signed by Ruben Cole MD on 11/24/2022 at 8:19 AM

## 2022-11-25 ENCOUNTER — APPOINTMENT (OUTPATIENT)
Dept: GENERAL RADIOLOGY | Age: 57
DRG: 472 | End: 2022-11-25
Payer: MEDICARE

## 2022-11-25 LAB
ANION GAP SERPL CALCULATED.3IONS-SCNC: 8 MMOL/L (ref 7–19)
BASOPHILS ABSOLUTE: 0 K/UL (ref 0–0.2)
BASOPHILS RELATIVE PERCENT: 0.4 % (ref 0–1)
BUN BLDV-MCNC: 18 MG/DL (ref 6–20)
CALCIUM SERPL-MCNC: 9 MG/DL (ref 8.6–10)
CHLORIDE BLD-SCNC: 99 MMOL/L (ref 98–111)
CO2: 28 MMOL/L (ref 22–29)
CREAT SERPL-MCNC: 0.5 MG/DL (ref 0.5–1.2)
EOSINOPHILS ABSOLUTE: 0.1 K/UL (ref 0–0.6)
EOSINOPHILS RELATIVE PERCENT: 0.8 % (ref 0–5)
GFR SERPL CREATININE-BSD FRML MDRD: >60 ML/MIN/{1.73_M2}
GLUCOSE BLD-MCNC: 110 MG/DL (ref 74–109)
HCT VFR BLD CALC: 38.2 % (ref 42–52)
HEMOGLOBIN: 12.5 G/DL (ref 14–18)
IMMATURE GRANULOCYTES #: 0 K/UL
LYMPHOCYTES ABSOLUTE: 1.7 K/UL (ref 1.1–4.5)
LYMPHOCYTES RELATIVE PERCENT: 20.8 % (ref 20–40)
MCH RBC QN AUTO: 31.3 PG (ref 27–31)
MCHC RBC AUTO-ENTMCNC: 32.7 G/DL (ref 33–37)
MCV RBC AUTO: 95.5 FL (ref 80–94)
MONOCYTES ABSOLUTE: 0.7 K/UL (ref 0–0.9)
MONOCYTES RELATIVE PERCENT: 8.7 % (ref 0–10)
NEUTROPHILS ABSOLUTE: 5.8 K/UL (ref 1.5–7.5)
NEUTROPHILS RELATIVE PERCENT: 68.9 % (ref 50–65)
PDW BLD-RTO: 14 % (ref 11.5–14.5)
PLATELET # BLD: 169 K/UL (ref 130–400)
PMV BLD AUTO: 11.8 FL (ref 9.4–12.4)
POTASSIUM REFLEX MAGNESIUM: 4.5 MMOL/L (ref 3.5–5)
RBC # BLD: 4 M/UL (ref 4.7–6.1)
SODIUM BLD-SCNC: 135 MMOL/L (ref 136–145)
WBC # BLD: 8.4 K/UL (ref 4.8–10.8)

## 2022-11-25 PROCEDURE — 99024 POSTOP FOLLOW-UP VISIT: CPT | Performed by: NEUROLOGICAL SURGERY

## 2022-11-25 PROCEDURE — 6370000000 HC RX 637 (ALT 250 FOR IP): Performed by: NEUROLOGICAL SURGERY

## 2022-11-25 PROCEDURE — 36415 COLL VENOUS BLD VENIPUNCTURE: CPT

## 2022-11-25 PROCEDURE — 94760 N-INVAS EAR/PLS OXIMETRY 1: CPT

## 2022-11-25 PROCEDURE — 97116 GAIT TRAINING THERAPY: CPT

## 2022-11-25 PROCEDURE — 6360000002 HC RX W HCPCS: Performed by: NEUROLOGICAL SURGERY

## 2022-11-25 PROCEDURE — 85025 COMPLETE CBC W/AUTO DIFF WBC: CPT

## 2022-11-25 PROCEDURE — 6370000000 HC RX 637 (ALT 250 FOR IP): Performed by: NURSE PRACTITIONER

## 2022-11-25 PROCEDURE — 1210000000 HC MED SURG R&B

## 2022-11-25 PROCEDURE — 73070 X-RAY EXAM OF ELBOW: CPT

## 2022-11-25 PROCEDURE — 6370000000 HC RX 637 (ALT 250 FOR IP): Performed by: HOSPITALIST

## 2022-11-25 PROCEDURE — 80048 BASIC METABOLIC PNL TOTAL CA: CPT

## 2022-11-25 PROCEDURE — 2580000003 HC RX 258: Performed by: NEUROLOGICAL SURGERY

## 2022-11-25 RX ADMIN — HYDROMORPHONE HYDROCHLORIDE 0.5 MG: 1 INJECTION, SOLUTION INTRAMUSCULAR; INTRAVENOUS; SUBCUTANEOUS at 08:49

## 2022-11-25 RX ADMIN — TRAZODONE HYDROCHLORIDE 50 MG: 50 TABLET ORAL at 19:42

## 2022-11-25 RX ADMIN — SODIUM CHLORIDE, PRESERVATIVE FREE 10 ML: 5 INJECTION INTRAVENOUS at 20:09

## 2022-11-25 RX ADMIN — OXYCODONE AND ACETAMINOPHEN 2 TABLET: 10; 325 TABLET ORAL at 06:26

## 2022-11-25 RX ADMIN — OXYCODONE AND ACETAMINOPHEN 2 TABLET: 10; 325 TABLET ORAL at 02:13

## 2022-11-25 RX ADMIN — GABAPENTIN 600 MG: 600 TABLET, FILM COATED ORAL at 20:11

## 2022-11-25 RX ADMIN — OXYCODONE AND ACETAMINOPHEN 2 TABLET: 10; 325 TABLET ORAL at 19:42

## 2022-11-25 RX ADMIN — GABAPENTIN 600 MG: 600 TABLET, FILM COATED ORAL at 17:40

## 2022-11-25 RX ADMIN — GABAPENTIN 600 MG: 600 TABLET, FILM COATED ORAL at 14:51

## 2022-11-25 RX ADMIN — POLYETHYLENE GLYCOL 3350 17 G: 17 POWDER, FOR SOLUTION ORAL at 08:49

## 2022-11-25 RX ADMIN — FERROUS SULFATE TAB 325 MG (65 MG ELEMENTAL FE) 325 MG: 325 (65 FE) TAB at 08:49

## 2022-11-25 RX ADMIN — OXYCODONE AND ACETAMINOPHEN 2 TABLET: 10; 325 TABLET ORAL at 11:38

## 2022-11-25 RX ADMIN — FOLIC ACID 1 MG: 1 TABLET ORAL at 08:49

## 2022-11-25 RX ADMIN — SODIUM CHLORIDE, PRESERVATIVE FREE 10 ML: 5 INJECTION INTRAVENOUS at 09:43

## 2022-11-25 RX ADMIN — NALOXEGOL OXALATE 12.5 MG: 12.5 TABLET, FILM COATED ORAL at 06:26

## 2022-11-25 RX ADMIN — OXYCODONE AND ACETAMINOPHEN 2 TABLET: 10; 325 TABLET ORAL at 14:51

## 2022-11-25 RX ADMIN — FAMOTIDINE 20 MG: 20 TABLET ORAL at 08:49

## 2022-11-25 RX ADMIN — GABAPENTIN 600 MG: 600 TABLET, FILM COATED ORAL at 06:26

## 2022-11-25 RX ADMIN — GABAPENTIN 600 MG: 600 TABLET, FILM COATED ORAL at 10:40

## 2022-11-25 RX ADMIN — PROMETHAZINE HYDROCHLORIDE 12.5 MG: 12.5 TABLET ORAL at 10:57

## 2022-11-25 RX ADMIN — SENNOSIDES AND DOCUSATE SODIUM 2 TABLET: 50; 8.6 TABLET ORAL at 19:42

## 2022-11-25 RX ADMIN — HYDROMORPHONE HYDROCHLORIDE 0.5 MG: 1 INJECTION, SOLUTION INTRAMUSCULAR; INTRAVENOUS; SUBCUTANEOUS at 04:01

## 2022-11-25 RX ADMIN — FAMOTIDINE 20 MG: 20 TABLET ORAL at 19:42

## 2022-11-25 RX ADMIN — Medication 50 MCG: at 08:49

## 2022-11-25 RX ADMIN — TIZANIDINE 4 MG: 4 TABLET ORAL at 19:43

## 2022-11-25 RX ADMIN — Medication 5 MG: at 19:43

## 2022-11-25 RX ADMIN — SENNOSIDES AND DOCUSATE SODIUM 2 TABLET: 50; 8.6 TABLET ORAL at 08:50

## 2022-11-25 ASSESSMENT — ENCOUNTER SYMPTOMS
ABDOMINAL PAIN: 0
SORE THROAT: 0
CONSTIPATION: 0
COLOR CHANGE: 0
DIARRHEA: 0
BACK PAIN: 1
SHORTNESS OF BREATH: 0
NAUSEA: 0
VOMITING: 0
CHEST TIGHTNESS: 0
TROUBLE SWALLOWING: 0

## 2022-11-25 ASSESSMENT — PAIN DESCRIPTION - DESCRIPTORS
DESCRIPTORS: ACHING
DESCRIPTORS: ACHING;DISCOMFORT;JABBING

## 2022-11-25 ASSESSMENT — PAIN DESCRIPTION - LOCATION
LOCATION: HEAD;NECK
LOCATION: NECK
LOCATION: HAND;BACK;NECK
LOCATION: NECK;HEAD

## 2022-11-25 ASSESSMENT — PAIN DESCRIPTION - FREQUENCY
FREQUENCY: CONTINUOUS

## 2022-11-25 ASSESSMENT — PAIN SCALES - GENERAL
PAINLEVEL_OUTOF10: 9
PAINLEVEL_OUTOF10: 9
PAINLEVEL_OUTOF10: 10
PAINLEVEL_OUTOF10: 9
PAINLEVEL_OUTOF10: 9
PAINLEVEL_OUTOF10: 10
PAINLEVEL_OUTOF10: 9
PAINLEVEL_OUTOF10: 10

## 2022-11-25 ASSESSMENT — PAIN DESCRIPTION - ORIENTATION
ORIENTATION: POSTERIOR
ORIENTATION: MID

## 2022-11-25 ASSESSMENT — PAIN - FUNCTIONAL ASSESSMENT
PAIN_FUNCTIONAL_ASSESSMENT: ACTIVITIES ARE NOT PREVENTED
PAIN_FUNCTIONAL_ASSESSMENT: ACTIVITIES ARE NOT PREVENTED
PAIN_FUNCTIONAL_ASSESSMENT: PREVENTS OR INTERFERES SOME ACTIVE ACTIVITIES AND ADLS

## 2022-11-25 ASSESSMENT — PAIN DESCRIPTION - ONSET: ONSET: ON-GOING

## 2022-11-25 ASSESSMENT — PAIN SCALES - WONG BAKER
WONGBAKER_NUMERICALRESPONSE: 4
WONGBAKER_NUMERICALRESPONSE: 4

## 2022-11-25 ASSESSMENT — PAIN DESCRIPTION - PAIN TYPE
TYPE: SURGICAL PAIN;ACUTE PAIN

## 2022-11-25 NOTE — PROGRESS NOTES
Nutrition Assessment     Type and Reason for Visit: Initial, RD Nutrition Re-Screen/LOS    Nutrition Recommendations/Plan:   Continue POC. Malnutrition Assessment:  Malnutrition Status: No malnutrition    Nutrition Assessment:  LOS assessment. Pt presents adequately nourished with sufficient PO intake of >75% most meals. Wt stable over the past month per wt records. He is receiving a regular diet. No nutritional needs identified at this time. Continue current POC. Will follow for changes in status. Nutrition Related Findings:     Wound Type: Surgical Incision (neck)    Current Nutrition Therapies:    ADULT DIET; Regular;  No Caffeine    Anthropometric Measures:  Height: 5' 7\" (170.2 cm)  Current Body Wt: 165 lb (74.8 kg)   BMI: 25.8    Nutrition Diagnosis:   No nutrition diagnosis at this time     Nutrition Interventions:   Food and/or Nutrient Delivery: Continue Current Diet  Nutrition Education/Counseling: No recommendation at this time  Coordination of Nutrition Care: Continue to monitor while inpatient       Goals:     Goals: PO intake 50% or greater       Nutrition Monitoring and Evaluation:   Behavioral-Environmental Outcomes: None Identified  Food/Nutrient Intake Outcomes: Food and Nutrient Intake  Physical Signs/Symptoms Outcomes: None Identified    Discharge Planning:    No discharge needs at this time     Taty Betancourt MS, RDN, LD, [facility]  Contact: [phone]

## 2022-11-25 NOTE — PROGRESS NOTES
Physical Therapy  Name: Stella Baker  MRN:  [id number]  Date of service:  11/25/2022 11/25/22 1426   Restrictions/Precautions   Restrictions/Precautions Fall Risk;ROM Restrictions;Surgical Protocols   Required Braces or Orthoses? Yes   Required Braces or Orthoses   Cervical c-collar   Position Activity Restriction   Spinal Precautions No Bending; No Lifting; No Twisting   Other position/activity restrictions cervical collar   Subjective   Subjective Pt agreed to therapy. Pain Assessment   Pain Assessment 0-10   Pain Level 9   Pain Location Head;Neck   Pain Orientation Posterior   Pain Descriptors Aching   Functional Pain Assessment Activities are not prevented   Pain Type Surgical pain;Acute pain   Pain Frequency Continuous   Non-Pharmaceutical Pain Intervention(s) Ambulation/Increased Activity;Repositioned; Rest   Response to Pain Intervention Patient satisfied   Bed Mobility   Supine to Sit Stand by assistance   Sit to Supine Stand by assistance   Comment v/c's for log roll   Transfers   Sit to Stand Contact guard assistance   Stand to Sit Contact guard assistance   Ambulation   Surface Level tile   Device Rolling Walker   Assistance Contact guard assistance   Quality of Gait unsteady, ataxic   Gait Deviations Slow Rachel;Decreased step length;Decreased step height   Distance 76'   Patient Goals    Patient Goals  go home   Short Term Goals   Time Frame for Short Term Goals 2 wks   Short Term Goal 1 supine to sit indep   Short Term Goal 2 sit to stand indep   Short Term Goal 3 amb. 200' with RW SBA   Conditions Requiring Skilled Therapeutic Intervention   Body Structures, Functions, Activity Limitations Requiring Skilled Therapeutic Intervention Decreased functional mobility ; Decreased strength;Decreased balance;Decreased posture; Increased pain;Decreased safe awareness;Decreased cognition;Decreased ROM; Decreased body mechanics; Decreased sensation;Decreased coordination;Decreased fine motor control Assessment Pt able to amb slightly increased distance without LOB but unsteady overall. With cueing pt able to improve foot clearance. Pt assisted back to bed with all needs in reach. Activity Tolerance   Activity Tolerance Patient tolerated treatment well   PT Plan of Care   Friday X   Safety Devices   Type of Devices Call light within reach; Bed alarm in place; Left in bed         Electronically signed by Namrata Muñoz PTA on 11/25/2022 at 2:32 PM

## 2022-11-25 NOTE — PROGRESS NOTES
NEUROSURGERY POSTOPERATIVE PROGRESS NOTE      Chief Complaint:   Chief Complaint   Patient presents with    Spasms     Was told to come back if spasms were worsening, is set to have to have surgery on 11/29    Numbness     Patient states that it feels \"numb\" in his abdomen         Date of Surgery: 11/22/2022    Procedure Performed: C3-6 laminectomy and fusion      Interval Update:    No overnight events. He reports the expected postoperative neck pain and stiffness. He is making slow progress with PT/OT. He reports a significant improvement in the neck and back spasms he was experiencing prior to surgery. He feels he has more sensation in his hands but they are still somewhat numb. He is voiding spontaneously and had a BM yesterday. HPI:     Patient known to me from recent in-office evaluation with plan for surgery on 11/29. His myelopathic symptoms appear to be worsening. His gait and balance impairment is progressing and he has new numbness in his abdomen. Exam remains significant for bilateral Lyon's, numbness in the hands and ataxic gait. His wife is having difficulty caring for him at home. Objective:    /83   Pulse 79   Temp 98.6 °F (37 °C)   Resp 19   Ht 5' 7\" (1.702 m)   Wt 165 lb (74.8 kg)   SpO2 92%   BMI 25.84 kg/m²     HMV drain: 175 mL x 24h (30 mL overnight)    Physical Exam:    General: alert, cooperative, no distress, c-collar in place  Cardiorespiratory: unlabored breathing  Wound: Covered by clean Mepilex dressing      Neurologic Exam:    Mental Status: Alert, oriented, thought content appropriate  Cranial Nerves: PERRL, EOMI, symmetric facies, tongue midline  Motor: 5/5 BUE deltoid, biceps, triceps, 4/5 .   5/5 BLE HF, KE, ADF, APF  Somatosensory: Decreased to light touch in the bilateral hands and feet                Assessment and Plan:    62 y.o. M now s/p C3-6 laminectomy and fusion on 11/22/2022 for progressive cervical myelopathy with severe spinal cord compression. He is doing well postoperatively. He has been accepted to inpatient rehab pending insurance approval but now states he prefers to go home with home health services.     - DC IV dilaudid, continue percocet, tizanidine and gabapentin  - DC hemovac drain  - Aggressive bowel regimen  - Mobilize with PT/OT  - Disposition (home health vs IPR) to depend on pain control and progress with PT/OT      Electronically signed by Elaine العراقي MD on 11/25/2022 at 11:43 AM

## 2022-11-25 NOTE — PROGRESS NOTES
Physical Therapy  Name: Dez Gilliland  MRN:  [id number]  Date of service:  11/25/2022 11/25/22 1026   Restrictions/Precautions   Restrictions/Precautions Fall Risk;ROM Restrictions;Surgical Protocols   Required Braces or Orthoses? Yes   Required Braces or Orthoses   Cervical c-collar   Position Activity Restriction   Spinal Precautions No Bending; No Lifting; No Twisting   Other position/activity restrictions cervical collar   Subjective   Subjective Pt agreed to therapy. Pain Assessment   Pain Assessment Mcdaniels-Baker FACES   Mcdaniels-Baker Pain Rating 4   Pain Location Neck;Head   Pain Orientation Posterior   Pain Descriptors Aching   Functional Pain Assessment Activities are not prevented   Pain Type Surgical pain;Acute pain   Pain Frequency Continuous   Non-Pharmaceutical Pain Intervention(s) Ambulation/Increased Activity;Repositioned; Rest   Response to Pain Intervention Patient satisfied   Bed Mobility   Supine to Sit Stand by assistance  (with HOB raised)   Transfers   Sit to Stand Minimal Assistance   Stand to Sit Contact guard assistance   Ambulation   Surface Level tile   Device Rolling Walker   Assistance Contact guard assistance   Quality of Gait unsteady, ataxic   Gait Deviations Slow Rachel;Decreased step length;Decreased step height   Distance 48'   Patient Goals    Patient Goals  go home   Short Term Goals   Time Frame for Short Term Goals 2 wks   Short Term Goal 1 supine to sit indep   Short Term Goal 2 sit to stand indep   Short Term Goal 3 amb. 200' with RW SBA   Conditions Requiring Skilled Therapeutic Intervention   Body Structures, Functions, Activity Limitations Requiring Skilled Therapeutic Intervention Decreased functional mobility ; Decreased strength;Decreased balance;Decreased posture; Increased pain;Decreased safe awareness;Decreased cognition;Decreased ROM; Decreased body mechanics; Decreased sensation;Decreased coordination;Decreased fine motor control   Assessment Pt able to amb slightly increased distance without LOB but unsteady. Pt states he feels uncoordinated and demonstrates ataxic gait. Assisted pt to chair with all needs in reach. Activity Tolerance   Activity Tolerance Patient tolerated treatment well   PT Plan of Care   Friday X   Safety Devices   Type of Devices Call light within reach; Chair alarm in place; Left in chair         Electronically signed by Panchito Farr PTA on 11/25/2022 at 10:31 AM

## 2022-11-25 NOTE — PROGRESS NOTES
[facility] Hospitalists      Patient:  Cranston Runner  YOB: 1965  Date of Service: 11/25/2022  MRN: [id number]   Acct: [de-identified]   Primary Care Physician: Elly Meyer DO  Advance Directive: Full Code  Admit Date: 11/20/2022       Hospital Day: 5  Portions of this note have been copied forward, however, changed to reflect the most current clinical status of this patient. CHIEF COMPLAINT back spasms and numbness    SUBJECTIVE: Feeling stronger today. Was able to walk in the murguia with therapy. Reports improvement in pain today. Denies nausea, vomiting, and diarrhea. Reports bowel movement yesterday. Reports still having numbness and tingling in his hands but feels it is somewhat getting better. CUMULATIVE HOSPITAL COURSE:  Patient is a 66-year-old male with past medical history of tobacco abuse and cervical spinal cord compression who presented to [facility] ED with complaints of worsening neurological symptoms. Patient reported being seen in the outpatient setting by neurosurgery with recommendation for a C3-6 laminectomy with fusion scheduled for 11/29/2022. Patient states he was told to go to the ED for any worsening symptoms. Patient reported having worsening bilateral upper extremity weakness and numbness. Patient also reported having numbness while of the abdomen. Patient reports having more difficulty ambulating. Patient denies issues with bowel and bladder control. Patient was admitted to the hospitalist service with neurosurgery consultation. CT cervical spine without contrast indicated straightening of the normal cervical lordosis, grade 1 anterolisthesis of C2 on C3, C7 on T1, and T2 on T3, grade 1 retrolisthesis of C3 on C4, multilevel advanced degenerative changes of the cervical spine causing severe spinal canal stenosis at C4 C3, varying degrees of neuroforaminal stenosis including severe neuroforaminal stenosis at multiple levels.   Due to progression of symptoms neurosurgery recommended proceeding with surgical intervention. Patient underwent C3-6 decompression laminectomy and posterior fusion on 11/22/2022 with Dr. Felisa Yoder. Pain medications adjusted by neurosurgery for better pain control. Neurosurgery recommended Hemovac to gravity instead of suction today. Aggressive bowel regimen initiated, Movantik added to bowel regimen. Continues to require IV pain medication. We will continue with PT/OT evaluations for discharge planning, accepted to inpatient rehab pending insurance approval.    Review of Systems:   Review of Systems   Constitutional:  Negative for appetite change, chills, fatigue and fever. HENT:  Negative for sore throat and trouble swallowing. Respiratory:  Negative for chest tightness and shortness of breath. Cardiovascular:  Negative for chest pain, palpitations and leg swelling. Gastrointestinal:  Negative for abdominal pain, constipation, diarrhea, nausea and vomiting. Genitourinary:  Negative for difficulty urinating, dysuria, flank pain, frequency and urgency. Musculoskeletal:  Positive for back pain. Negative for neck pain. Back spasms   Skin:  Negative for color change and wound. Neurological:  Positive for weakness and numbness. Negative for headaches. Psychiatric/Behavioral:  Negative for sleep disturbance. The patient is nervous/anxious. 14 point review of systems is negative except as specifically addressed above. Objective:   VITALS:  /83   Pulse 79   Temp 98.6 °F (37 °C)   Resp 19   Ht 5' 7\" (1.702 m)   Wt 165 lb (74.8 kg)   SpO2 92%   BMI 25.84 kg/m²   24HR INTAKE/OUTPUT:    Intake/Output Summary (Last 24 hours) at 11/25/2022 1006  Last data filed at 11/25/2022 0705  Gross per 24 hour   Intake 240 ml   Output 1005 ml   Net -765 ml         Physical Exam  Vitals reviewed. Constitutional:       Appearance: He is not ill-appearing.    HENT:      Mouth/Throat:      Mouth: Mucous membranes are moist.      Pharynx: Oropharynx is clear. Eyes:      Pupils: Pupils are equal, round, and reactive to light. Neck:      Comments: Hard cervical collar in place, Hemovac drain with bloody drainage noted, incision site not visualized for hard collar. Cardiovascular:      Rate and Rhythm: Normal rate and regular rhythm. Pulses: Normal pulses. Heart sounds: Normal heart sounds. Pulmonary:      Effort: Pulmonary effort is normal.      Breath sounds: Normal breath sounds. Abdominal:      General: Abdomen is flat. Bowel sounds are normal. There is no distension. Palpations: Abdomen is soft. Tenderness: There is no abdominal tenderness. There is no guarding. Musculoskeletal:      Cervical back: No tenderness. Right lower leg: No edema. Left lower leg: No edema. Skin:     General: Skin is warm and dry. Capillary Refill: Capillary refill takes less than 2 seconds. Neurological:      Mental Status: He is alert and oriented to person, place, and time. Sensory: Sensory deficit present. Motor: Weakness present. Gait: Gait abnormal.           Medications:      sodium chloride        ferrous sulfate  325 mg Oral Daily with breakfast    folic acid  1 mg Oral Daily    vitamin B-12  50 mcg Oral Daily    sennosides-docusate sodium  2 tablet Oral BID    polyethylene glycol  17 g Oral Daily    melatonin  5 mg Oral Nightly    naloxegol  12.5 mg Oral QAM AC    nicotine  1 patch TransDERmal Daily    gabapentin  600 mg Oral 5x Daily    sodium chloride flush  5-40 mL IntraVENous 2 times per day    famotidine  20 mg Oral BID     oxyCODONE-acetaminophen, HYDROmorphone **OR** HYDROmorphone, bisacodyl, oxyCODONE-acetaminophen, traZODone, tiZANidine, sodium chloride flush, sodium chloride, potassium chloride, promethazine **OR** ondansetron, acetaminophen **OR** acetaminophen  ADULT DIET; Regular;  No Caffeine     Lab and other Data:     Recent Labs     11/23/22  0147 11/23/22  1366 11/25/22  0447   WBC 8.2 8.9 8.4   HGB 11.4* 11.3* 12.5*    143 169       Recent Labs     11/23/22  0147 11/23/22  2356 11/25/22 0447    137 135*   K 4.6 4.2 4.5    103 99   CO2 26 26 28   BUN 21* 21* 18   CREATININE 0.6 0.5 0.5   GLUCOSE 157* 109 110*       No results for input(s): AST, ALT, ALB, BILITOT, ALKPHOS in the last 72 hours. Troponin T: No results for input(s): TROPONINI in the last 72 hours. Pro-BNP: No results for input(s): BNP in the last 72 hours. INR:   No results for input(s): INR in the last 72 hours. UA:No results for input(s): NITRITE, COLORU, PHUR, LABCAST, WBCUA, RBCUA, MUCUS, TRICHOMONAS, YEAST, BACTERIA, CLARITYU, SPECGRAV, LEUKOCYTESUR, UROBILINOGEN, BILIRUBINUR, BLOODU, GLUCOSEU, AMORPHOUS in the last 72 hours. Invalid input(s): Eliel Rein  A1C: No results for input(s): LABA1C in the last 72 hours. ABG:No results for input(s): PHART, XRG6ZDF, PO2ART, JQX8CXP, BEART, HGBAE, F7YBIMTE, CARBOXHGBART in the last 72 hours. RAD:   XR CERVICAL SPINE (2-3 VIEWS)    Result Date: 11/23/2022  Impression: Cervical spondylotic changes on preoperative radiograph. Postoperative multiple laminectomy defects and metallic fixation implant in place. Recommendation: Follow up as clinical indicated. Electronically Signed by Yudy Nobles MD at 23-Nov-2022 03:31:18 AM EST             CT CERVICAL SPINE WO CONTRAST    Result Date: 11/21/2022  Impression: 1. No acute osseous abnormality of the cervical spine. 2.Straightening of the normal cervical lordosis. 3.Grade 1 anterolisthesis of C2 on C3, C7 on T1 and T2 on T3. 4.Grade 1 retrolisthesis of C3 on C4. 4.Multilevel advanced degenerative changes of the cervical spine, causing severe spinal canal stenosis at C3-C4. 5.Varying degrees of neuroforaminal stenosis, including severe neuroforaminal stenosis at multiple levels.        Assessment/Plan   Principal Problem:    Back spasm / Cervical myelopathy (HCC) / Cervical spinal cord compression ([address]              -neurosurgery on board                          -Postop day 3, C3-6 laminectomy and fusion              -Pain regimen per neurosurgery, still requiring IV dilaudid              -PT/OT               -fall precautions              -bed alarm              -neurovascular checks   -Inpatient rehab consultation, accepted pending insurance approval   -Aggressive bowel regimen, Movantik added     Active Problems:    Spondylolisthesis of lumbosacral region              -noted, continue current pain management regimen        Tobacco use              -nicoderm patch              -smoking cessation education    Malissa Meigs, APRN - CNP, 11/25/2022 10:06 AM

## 2022-11-26 ENCOUNTER — APPOINTMENT (OUTPATIENT)
Dept: ULTRASOUND IMAGING | Age: 57
DRG: 472 | End: 2022-11-26
Payer: MEDICARE

## 2022-11-26 LAB
ANION GAP SERPL CALCULATED.3IONS-SCNC: 11 MMOL/L (ref 7–19)
BASOPHILS ABSOLUTE: 0 K/UL (ref 0–0.2)
BASOPHILS RELATIVE PERCENT: 0.2 % (ref 0–1)
BUN BLDV-MCNC: 16 MG/DL (ref 6–20)
CALCIUM SERPL-MCNC: 9 MG/DL (ref 8.6–10)
CHLORIDE BLD-SCNC: 97 MMOL/L (ref 98–111)
CO2: 25 MMOL/L (ref 22–29)
CREAT SERPL-MCNC: 0.6 MG/DL (ref 0.5–1.2)
EOSINOPHILS ABSOLUTE: 0.1 K/UL (ref 0–0.6)
EOSINOPHILS RELATIVE PERCENT: 0.5 % (ref 0–5)
GFR SERPL CREATININE-BSD FRML MDRD: >60 ML/MIN/{1.73_M2}
GLUCOSE BLD-MCNC: 130 MG/DL (ref 74–109)
HCT VFR BLD CALC: 37.3 % (ref 42–52)
HEMOGLOBIN: 12.5 G/DL (ref 14–18)
IMMATURE GRANULOCYTES #: 0 K/UL
LYMPHOCYTES ABSOLUTE: 1.8 K/UL (ref 1.1–4.5)
LYMPHOCYTES RELATIVE PERCENT: 19.4 % (ref 20–40)
MCH RBC QN AUTO: 30.9 PG (ref 27–31)
MCHC RBC AUTO-ENTMCNC: 33.5 G/DL (ref 33–37)
MCV RBC AUTO: 92.1 FL (ref 80–94)
MONOCYTES ABSOLUTE: 0.9 K/UL (ref 0–0.9)
MONOCYTES RELATIVE PERCENT: 10.2 % (ref 0–10)
NEUTROPHILS ABSOLUTE: 6.3 K/UL (ref 1.5–7.5)
NEUTROPHILS RELATIVE PERCENT: 69.4 % (ref 50–65)
PDW BLD-RTO: 13.5 % (ref 11.5–14.5)
PLATELET # BLD: 187 K/UL (ref 130–400)
PMV BLD AUTO: 11.4 FL (ref 9.4–12.4)
POTASSIUM REFLEX MAGNESIUM: 4.2 MMOL/L (ref 3.5–5)
RBC # BLD: 4.05 M/UL (ref 4.7–6.1)
SODIUM BLD-SCNC: 133 MMOL/L (ref 136–145)
WBC # BLD: 9.1 K/UL (ref 4.8–10.8)

## 2022-11-26 PROCEDURE — 6370000000 HC RX 637 (ALT 250 FOR IP): Performed by: NEUROLOGICAL SURGERY

## 2022-11-26 PROCEDURE — 94760 N-INVAS EAR/PLS OXIMETRY 1: CPT

## 2022-11-26 PROCEDURE — 76882 US LMTD JT/FCL EVL NVASC XTR: CPT

## 2022-11-26 PROCEDURE — 99024 POSTOP FOLLOW-UP VISIT: CPT | Performed by: NEUROLOGICAL SURGERY

## 2022-11-26 PROCEDURE — 1210000000 HC MED SURG R&B

## 2022-11-26 PROCEDURE — 6370000000 HC RX 637 (ALT 250 FOR IP): Performed by: NURSE PRACTITIONER

## 2022-11-26 PROCEDURE — 80048 BASIC METABOLIC PNL TOTAL CA: CPT

## 2022-11-26 PROCEDURE — 2580000003 HC RX 258: Performed by: NEUROLOGICAL SURGERY

## 2022-11-26 PROCEDURE — 36415 COLL VENOUS BLD VENIPUNCTURE: CPT

## 2022-11-26 PROCEDURE — 85025 COMPLETE CBC W/AUTO DIFF WBC: CPT

## 2022-11-26 PROCEDURE — 97116 GAIT TRAINING THERAPY: CPT

## 2022-11-26 PROCEDURE — 6370000000 HC RX 637 (ALT 250 FOR IP): Performed by: HOSPITALIST

## 2022-11-26 RX ADMIN — FOLIC ACID 1 MG: 1 TABLET ORAL at 08:40

## 2022-11-26 RX ADMIN — OXYCODONE AND ACETAMINOPHEN 2 TABLET: 10; 325 TABLET ORAL at 17:01

## 2022-11-26 RX ADMIN — SODIUM CHLORIDE, PRESERVATIVE FREE 10 ML: 5 INJECTION INTRAVENOUS at 21:37

## 2022-11-26 RX ADMIN — SENNOSIDES AND DOCUSATE SODIUM 2 TABLET: 50; 8.6 TABLET ORAL at 08:41

## 2022-11-26 RX ADMIN — GABAPENTIN 600 MG: 600 TABLET, FILM COATED ORAL at 11:36

## 2022-11-26 RX ADMIN — Medication 5 MG: at 21:35

## 2022-11-26 RX ADMIN — POLYETHYLENE GLYCOL 3350 17 G: 17 POWDER, FOR SOLUTION ORAL at 08:43

## 2022-11-26 RX ADMIN — OXYCODONE AND ACETAMINOPHEN 2 TABLET: 10; 325 TABLET ORAL at 00:55

## 2022-11-26 RX ADMIN — GABAPENTIN 600 MG: 600 TABLET, FILM COATED ORAL at 04:46

## 2022-11-26 RX ADMIN — FAMOTIDINE 20 MG: 20 TABLET ORAL at 08:41

## 2022-11-26 RX ADMIN — TIZANIDINE 4 MG: 4 TABLET ORAL at 11:36

## 2022-11-26 RX ADMIN — TRAZODONE HYDROCHLORIDE 50 MG: 50 TABLET ORAL at 21:35

## 2022-11-26 RX ADMIN — FERROUS SULFATE TAB 325 MG (65 MG ELEMENTAL FE) 325 MG: 325 (65 FE) TAB at 08:41

## 2022-11-26 RX ADMIN — FAMOTIDINE 20 MG: 20 TABLET ORAL at 21:35

## 2022-11-26 RX ADMIN — OXYCODONE AND ACETAMINOPHEN 2 TABLET: 10; 325 TABLET ORAL at 21:35

## 2022-11-26 RX ADMIN — Medication 50 MCG: at 08:40

## 2022-11-26 RX ADMIN — OXYCODONE AND ACETAMINOPHEN 2 TABLET: 10; 325 TABLET ORAL at 12:46

## 2022-11-26 RX ADMIN — GABAPENTIN 600 MG: 600 TABLET, FILM COATED ORAL at 15:27

## 2022-11-26 RX ADMIN — NALOXEGOL OXALATE 12.5 MG: 12.5 TABLET, FILM COATED ORAL at 04:47

## 2022-11-26 RX ADMIN — SODIUM CHLORIDE, PRESERVATIVE FREE 10 ML: 5 INJECTION INTRAVENOUS at 08:41

## 2022-11-26 RX ADMIN — TIZANIDINE 4 MG: 4 TABLET ORAL at 04:46

## 2022-11-26 RX ADMIN — OXYCODONE AND ACETAMINOPHEN 2 TABLET: 10; 325 TABLET ORAL at 04:46

## 2022-11-26 RX ADMIN — TIZANIDINE 4 MG: 4 TABLET ORAL at 21:35

## 2022-11-26 RX ADMIN — OXYCODONE AND ACETAMINOPHEN 2 TABLET: 10; 325 TABLET ORAL at 08:40

## 2022-11-26 RX ADMIN — GABAPENTIN 600 MG: 600 TABLET, FILM COATED ORAL at 21:35

## 2022-11-26 RX ADMIN — SENNOSIDES AND DOCUSATE SODIUM 2 TABLET: 50; 8.6 TABLET ORAL at 21:35

## 2022-11-26 ASSESSMENT — ENCOUNTER SYMPTOMS
BACK PAIN: 1
CHEST TIGHTNESS: 0
SORE THROAT: 0
NAUSEA: 0
VOMITING: 0
CONSTIPATION: 0
COLOR CHANGE: 1
TROUBLE SWALLOWING: 0
ABDOMINAL PAIN: 0
DIARRHEA: 0
SHORTNESS OF BREATH: 0

## 2022-11-26 ASSESSMENT — PAIN DESCRIPTION - LOCATION: LOCATION: NECK;BACK

## 2022-11-26 ASSESSMENT — PAIN SCALES - GENERAL
PAINLEVEL_OUTOF10: 9
PAINLEVEL_OUTOF10: 9
PAINLEVEL_OUTOF10: 10
PAINLEVEL_OUTOF10: 9
PAINLEVEL_OUTOF10: 10

## 2022-11-26 ASSESSMENT — PAIN DESCRIPTION - ORIENTATION: ORIENTATION: RIGHT;LEFT;MID

## 2022-11-26 ASSESSMENT — PAIN - FUNCTIONAL ASSESSMENT: PAIN_FUNCTIONAL_ASSESSMENT: PREVENTS OR INTERFERES SOME ACTIVE ACTIVITIES AND ADLS

## 2022-11-26 NOTE — PROGRESS NOTES
11/26/22 1136   Subjective   Subjective It feels good to walk. Pain Assessment   Pain Level 9   Vitals   O2 Device None (Room air)   Transfer Training   Transfer Training Yes   Sit to Stand Contact-guard assistance   Stand to Sit Stand-by assistance   Gait Training   Gait Training Yes   Gait   Overall Level of Assistance Contact-guard assistance   Distance (ft) 250 Feet   Assistive Device Walker, rolling   Other Specialty Interventions   Other Treatments/Modalities Patient in chair all needs in reach; lunch tray set up.    Assessment   Activity Tolerance Patient tolerated treatment well   PT Plan of Care   Saturday X         Electronically signed by Scott Arango PTA on 11/26/2022 at 12:06 PM

## 2022-11-26 NOTE — PROGRESS NOTES
[facility] Hospitalists      Patient:  Don Lock  YOB: 1965  Date of Service: 11/26/2022  MRN: [id number]   Acct: [de-identified]   Primary Care Physician: Khalida Escobedo DO  Advance Directive: Full Code  Admit Date: 11/20/2022       Hospital Day: 6      Patient seen and evaluated and I agree with the plan of care  Dr. Abeba Torres    Portions of this note have been copied forward, however, changed to reflect the most current clinical status of this patient. CHIEF COMPLAINT back spasms and numbness    SUBJECTIVE:  Complaining of right elbow pain and swelling today. Reports neck/back pain and spasms are much better today. States his arms feel cold. CUMULATIVE HOSPITAL COURSE:  Patient is a 71-year-old male with past medical history of tobacco abuse and cervical spinal cord compression who presented to [facility] ED with complaints of worsening neurological symptoms. Patient reported being seen in the outpatient setting by neurosurgery with recommendation for a C3-6 laminectomy with fusion scheduled for 11/29/2022. Patient states he was told to go to the ED for any worsening symptoms. Patient reported having worsening bilateral upper extremity weakness and numbness. Patient also reported having numbness while of the abdomen. Patient reports having more difficulty ambulating. Patient denies issues with bowel and bladder control. Patient was admitted to the hospitalist service with neurosurgery consultation. CT cervical spine without contrast indicated straightening of the normal cervical lordosis, grade 1 anterolisthesis of C2 on C3, C7 on T1, and T2 on T3, grade 1 retrolisthesis of C3 on C4, multilevel advanced degenerative changes of the cervical spine causing severe spinal canal stenosis at C4 C3, varying degrees of neuroforaminal stenosis including severe neuroforaminal stenosis at multiple levels.   Due to progression of symptoms neurosurgery recommended proceeding with surgical intervention. Patient underwent C3-6 decompression laminectomy and posterior fusion on 11/22/2022 with Dr. Carlos Suarez. Pain medications adjusted by neurosurgery for better pain control. Neurosurgery recommended Hemovac to gravity instead of suction today. Aggressive bowel regimen initiated, Movantik added to bowel regimen. IV pain medication discontinued and tolerating well without. Right elbow pain and swelling, xray unremarkable, US ordered  We will continue with PT/OT evaluations for discharge planning, accepted to inpatient rehab pending insurance approval.    Review of Systems:   Review of Systems   Constitutional:  Negative for appetite change, chills, fatigue and fever. HENT:  Negative for sore throat and trouble swallowing. Respiratory:  Negative for chest tightness and shortness of breath. Cardiovascular:  Negative for chest pain, palpitations and leg swelling. Gastrointestinal:  Negative for abdominal pain, constipation, diarrhea, nausea and vomiting. Genitourinary:  Negative for difficulty urinating, dysuria, flank pain, frequency and urgency. Musculoskeletal:  Positive for arthralgias (right elbow) and back pain. Negative for neck pain. Back spasms   Skin:  Positive for color change. Negative for wound. Neurological:  Positive for weakness and numbness. Negative for headaches. Psychiatric/Behavioral:  Negative for sleep disturbance. The patient is nervous/anxious. 14 point review of systems is negative except as specifically addressed above. Objective:   VITALS:  /80   Pulse (!) 102   Temp 98.1 °F (36.7 °C)   Resp 16   Ht 5' 7\" (1.702 m)   Wt 165 lb (74.8 kg)   SpO2 92%   BMI 25.84 kg/m²   24HR INTAKE/OUTPUT:    Intake/Output Summary (Last 24 hours) at 11/26/2022 1147  Last data filed at 11/26/2022 0911  Gross per 24 hour   Intake 240 ml   Output 800 ml   Net -560 ml       Physical Exam  Vitals reviewed.    Constitutional:       Appearance: He is not ill-appearing. HENT:      Mouth/Throat:      Mouth: Mucous membranes are moist.      Pharynx: Oropharynx is clear. Eyes:      Pupils: Pupils are equal, round, and reactive to light. Neck:      Comments: Hard cervical collar in place,  Cardiovascular:      Rate and Rhythm: Normal rate and regular rhythm. Pulses: Normal pulses. Heart sounds: Normal heart sounds. Pulmonary:      Effort: Pulmonary effort is normal.      Breath sounds: Normal breath sounds. Abdominal:      General: Abdomen is flat. Bowel sounds are normal. There is no distension. Palpations: Abdomen is soft. Tenderness: There is no abdominal tenderness. There is no guarding. Musculoskeletal:         General: Swelling present. Cervical back: No tenderness. Right lower leg: No edema. Left lower leg: No edema. Comments: Right elbow with localized redness, swelling, and tenderness   Skin:     General: Skin is warm and dry. Capillary Refill: Capillary refill takes less than 2 seconds. Neurological:      Mental Status: He is alert and oriented to person, place, and time. Sensory: Sensory deficit present. Motor: Weakness present. Gait: Gait abnormal.           Medications:      sodium chloride        ferrous sulfate  325 mg Oral Daily with breakfast    folic acid  1 mg Oral Daily    vitamin B-12  50 mcg Oral Daily    sennosides-docusate sodium  2 tablet Oral BID    polyethylene glycol  17 g Oral Daily    melatonin  5 mg Oral Nightly    naloxegol  12.5 mg Oral QAM AC    nicotine  1 patch TransDERmal Daily    gabapentin  600 mg Oral 5x Daily    sodium chloride flush  5-40 mL IntraVENous 2 times per day    famotidine  20 mg Oral BID     oxyCODONE-acetaminophen, bisacodyl, oxyCODONE-acetaminophen, traZODone, tiZANidine, sodium chloride flush, sodium chloride, potassium chloride, promethazine **OR** ondansetron, acetaminophen **OR** acetaminophen  ADULT DIET; Regular;  No Caffeine     Lab and on T1 and T2 on T3. 4.Grade 1 retrolisthesis of C3 on C4. 4.Multilevel advanced degenerative changes of the cervical spine, causing severe spinal canal stenosis at C3-C4. 5.Varying degrees of neuroforaminal stenosis, including severe neuroforaminal stenosis at multiple levels.        Assessment/Plan   Principal Problem:    Back spasm / Cervical myelopathy (HCC) / Cervical spinal cord compression (HCC)              -neurosurgery on board                          -Postop day 4, C3-6 laminectomy and fusion              -Pain regimen per neurosurgery              -PT/OT               -fall precautions              -bed alarm              -neurovascular checks              -Inpatient rehab consultation, accepted pending insurance approval              -Aggressive bowel regimen, Movantik added     Active Problems:  Right elbow pain   -xray unremarkable   -US      Spondylolisthesis of lumbosacral region              -noted, continue current pain management regimen        Tobacco use              -nicoderm patch              -smoking cessation education    CARYN Cameron - CNP, 11/26/2022 11:47 AM

## 2022-11-26 NOTE — PROGRESS NOTES
NEUROSURGERY POSTOPERATIVE PROGRESS NOTE      Chief Complaint:   Chief Complaint   Patient presents with    Spasms     Was told to come back if spasms were worsening, is set to have to have surgery on 11/29    Numbness     Patient states that it feels \"numb\" in his abdomen         Date of Surgery: 11/22/2022    Procedure Performed: C3-6 laminectomy and fusion      Interval Update:    No overnight events. Postop pain appears reasonably well-controlled. He is making some progress with PT/OT. HPI:     Patient known to me from recent in-office evaluation with plan for surgery on 11/29. His myelopathic symptoms appear to be worsening. His gait and balance impairment is progressing and he has new numbness in his abdomen. Exam remains significant for bilateral Lyon's, numbness in the hands and ataxic gait. His wife is having difficulty caring for him at home. Objective:    /80   Pulse (!) 102   Temp 98.1 °F (36.7 °C)   Resp 16   Ht 5' 7\" (1.702 m)   Wt 165 lb (74.8 kg)   SpO2 91%   BMI 25.84 kg/m²       Physical Exam:    General: alert, cooperative, no distress, c-collar in place  Cardiorespiratory: unlabored breathing  Wound: C/D/I with staples, covered by clean Mepilex dressing      Neurologic Exam:    Mental Status: Alert, oriented, thought content appropriate  Cranial Nerves: PERRL, EOMI, symmetric facies, tongue midline  Motor: 5/5 BUE deltoid, biceps, triceps, 4/5 . 5/5 BLE HF, KE, ADF, APF  Somatosensory: Decreased to light touch in the bilateral hands and feet                Assessment and Plan:    62 y.o. M now s/p C3-6 laminectomy and fusion on 11/22/2022 for progressive cervical myelopathy with severe spinal cord compression. He is doing well postoperatively.       - Continue percocet, tizanidine and gabapentin for pain  - OK to remove c-collar for meals and hygiene  - Aggressive bowel regimen  - Mobilize with PT/OT  - Anticipate transfer to inpatient rehab if approved by insurance      Electronically signed by Christian Tsang MD on 11/26/2022 at 11:13 AM

## 2022-11-27 LAB
ANION GAP SERPL CALCULATED.3IONS-SCNC: 11 MMOL/L (ref 7–19)
BASOPHILS ABSOLUTE: 0 K/UL (ref 0–0.2)
BASOPHILS RELATIVE PERCENT: 0.4 % (ref 0–1)
BUN BLDV-MCNC: 20 MG/DL (ref 6–20)
CALCIUM SERPL-MCNC: 8.8 MG/DL (ref 8.6–10)
CHLORIDE BLD-SCNC: 98 MMOL/L (ref 98–111)
CO2: 24 MMOL/L (ref 22–29)
CREAT SERPL-MCNC: 0.6 MG/DL (ref 0.5–1.2)
EOSINOPHILS ABSOLUTE: 0.1 K/UL (ref 0–0.6)
EOSINOPHILS RELATIVE PERCENT: 0.6 % (ref 0–5)
GFR SERPL CREATININE-BSD FRML MDRD: >60 ML/MIN/{1.73_M2}
GLUCOSE BLD-MCNC: 114 MG/DL (ref 74–109)
HCT VFR BLD CALC: 34.1 % (ref 42–52)
HEMOGLOBIN: 11.4 G/DL (ref 14–18)
IMMATURE GRANULOCYTES #: 0 K/UL
LYMPHOCYTES ABSOLUTE: 1.7 K/UL (ref 1.1–4.5)
LYMPHOCYTES RELATIVE PERCENT: 20.9 % (ref 20–40)
MCH RBC QN AUTO: 31.6 PG (ref 27–31)
MCHC RBC AUTO-ENTMCNC: 33.4 G/DL (ref 33–37)
MCV RBC AUTO: 94.5 FL (ref 80–94)
MONOCYTES ABSOLUTE: 1.1 K/UL (ref 0–0.9)
MONOCYTES RELATIVE PERCENT: 12.8 % (ref 0–10)
NEUTROPHILS ABSOLUTE: 5.3 K/UL (ref 1.5–7.5)
NEUTROPHILS RELATIVE PERCENT: 64.9 % (ref 50–65)
PDW BLD-RTO: 13.4 % (ref 11.5–14.5)
PLATELET # BLD: 166 K/UL (ref 130–400)
PMV BLD AUTO: 11.4 FL (ref 9.4–12.4)
POTASSIUM REFLEX MAGNESIUM: 4.3 MMOL/L (ref 3.5–5)
RBC # BLD: 3.61 M/UL (ref 4.7–6.1)
SODIUM BLD-SCNC: 133 MMOL/L (ref 136–145)
URIC ACID, SERUM: 2.6 MG/DL (ref 3.4–7)
WBC # BLD: 8.2 K/UL (ref 4.8–10.8)

## 2022-11-27 PROCEDURE — 2500000003 HC RX 250 WO HCPCS: Performed by: NURSE PRACTITIONER

## 2022-11-27 PROCEDURE — 84550 ASSAY OF BLOOD/URIC ACID: CPT

## 2022-11-27 PROCEDURE — 1210000000 HC MED SURG R&B

## 2022-11-27 PROCEDURE — 6370000000 HC RX 637 (ALT 250 FOR IP): Performed by: NEUROLOGICAL SURGERY

## 2022-11-27 PROCEDURE — 2580000003 HC RX 258: Performed by: NEUROLOGICAL SURGERY

## 2022-11-27 PROCEDURE — 80048 BASIC METABOLIC PNL TOTAL CA: CPT

## 2022-11-27 PROCEDURE — 99024 POSTOP FOLLOW-UP VISIT: CPT | Performed by: NEUROLOGICAL SURGERY

## 2022-11-27 PROCEDURE — 97116 GAIT TRAINING THERAPY: CPT

## 2022-11-27 PROCEDURE — 36415 COLL VENOUS BLD VENIPUNCTURE: CPT

## 2022-11-27 PROCEDURE — 6370000000 HC RX 637 (ALT 250 FOR IP): Performed by: HOSPITALIST

## 2022-11-27 PROCEDURE — 85025 COMPLETE CBC W/AUTO DIFF WBC: CPT

## 2022-11-27 PROCEDURE — 6370000000 HC RX 637 (ALT 250 FOR IP): Performed by: NURSE PRACTITIONER

## 2022-11-27 PROCEDURE — 97110 THERAPEUTIC EXERCISES: CPT

## 2022-11-27 PROCEDURE — 94760 N-INVAS EAR/PLS OXIMETRY 1: CPT

## 2022-11-27 PROCEDURE — 2580000003 HC RX 258: Performed by: NURSE PRACTITIONER

## 2022-11-27 RX ORDER — DOXYCYCLINE HYCLATE 100 MG/1
100 CAPSULE ORAL EVERY 12 HOURS SCHEDULED
Status: DISCONTINUED | OUTPATIENT
Start: 2022-11-28 | End: 2022-11-28 | Stop reason: HOSPADM

## 2022-11-27 RX ADMIN — Medication 5 MG: at 22:06

## 2022-11-27 RX ADMIN — GABAPENTIN 600 MG: 600 TABLET, FILM COATED ORAL at 18:28

## 2022-11-27 RX ADMIN — OXYCODONE AND ACETAMINOPHEN 2 TABLET: 10; 325 TABLET ORAL at 22:06

## 2022-11-27 RX ADMIN — DOXYCYCLINE 100 MG: 100 INJECTION, POWDER, LYOPHILIZED, FOR SOLUTION INTRAVENOUS at 22:12

## 2022-11-27 RX ADMIN — OXYCODONE AND ACETAMINOPHEN 2 TABLET: 10; 325 TABLET ORAL at 01:59

## 2022-11-27 RX ADMIN — SODIUM CHLORIDE, PRESERVATIVE FREE 10 ML: 5 INJECTION INTRAVENOUS at 22:11

## 2022-11-27 RX ADMIN — TIZANIDINE 4 MG: 4 TABLET ORAL at 22:06

## 2022-11-27 RX ADMIN — TRAZODONE HYDROCHLORIDE 50 MG: 50 TABLET ORAL at 22:06

## 2022-11-27 RX ADMIN — DOXYCYCLINE 100 MG: 100 INJECTION, POWDER, LYOPHILIZED, FOR SOLUTION INTRAVENOUS at 12:35

## 2022-11-27 RX ADMIN — TIZANIDINE 4 MG: 4 TABLET ORAL at 12:27

## 2022-11-27 RX ADMIN — FOLIC ACID 1 MG: 1 TABLET ORAL at 08:49

## 2022-11-27 RX ADMIN — NALOXEGOL OXALATE 12.5 MG: 12.5 TABLET, FILM COATED ORAL at 06:02

## 2022-11-27 RX ADMIN — GABAPENTIN 600 MG: 600 TABLET, FILM COATED ORAL at 13:49

## 2022-11-27 RX ADMIN — Medication 50 MCG: at 08:49

## 2022-11-27 RX ADMIN — SENNOSIDES AND DOCUSATE SODIUM 2 TABLET: 50; 8.6 TABLET ORAL at 08:49

## 2022-11-27 RX ADMIN — SODIUM CHLORIDE, PRESERVATIVE FREE 10 ML: 5 INJECTION INTRAVENOUS at 08:49

## 2022-11-27 RX ADMIN — OXYCODONE AND ACETAMINOPHEN 2 TABLET: 10; 325 TABLET ORAL at 10:04

## 2022-11-27 RX ADMIN — FAMOTIDINE 20 MG: 20 TABLET ORAL at 22:06

## 2022-11-27 RX ADMIN — OXYCODONE AND ACETAMINOPHEN 2 TABLET: 10; 325 TABLET ORAL at 06:01

## 2022-11-27 RX ADMIN — FAMOTIDINE 20 MG: 20 TABLET ORAL at 08:49

## 2022-11-27 RX ADMIN — SENNOSIDES AND DOCUSATE SODIUM 2 TABLET: 50; 8.6 TABLET ORAL at 22:06

## 2022-11-27 RX ADMIN — OXYCODONE AND ACETAMINOPHEN 2 TABLET: 10; 325 TABLET ORAL at 18:28

## 2022-11-27 RX ADMIN — OXYCODONE AND ACETAMINOPHEN 2 TABLET: 10; 325 TABLET ORAL at 14:36

## 2022-11-27 RX ADMIN — FERROUS SULFATE TAB 325 MG (65 MG ELEMENTAL FE) 325 MG: 325 (65 FE) TAB at 08:49

## 2022-11-27 RX ADMIN — GABAPENTIN 600 MG: 600 TABLET, FILM COATED ORAL at 06:01

## 2022-11-27 RX ADMIN — POLYETHYLENE GLYCOL 3350 17 G: 17 POWDER, FOR SOLUTION ORAL at 08:48

## 2022-11-27 RX ADMIN — TIZANIDINE 4 MG: 4 TABLET ORAL at 06:02

## 2022-11-27 RX ADMIN — GABAPENTIN 600 MG: 600 TABLET, FILM COATED ORAL at 22:06

## 2022-11-27 RX ADMIN — GABAPENTIN 600 MG: 600 TABLET, FILM COATED ORAL at 08:49

## 2022-11-27 ASSESSMENT — PAIN SCALES - GENERAL
PAINLEVEL_OUTOF10: 9
PAINLEVEL_OUTOF10: 7
PAINLEVEL_OUTOF10: 8
PAINLEVEL_OUTOF10: 7
PAINLEVEL_OUTOF10: 9

## 2022-11-27 ASSESSMENT — ENCOUNTER SYMPTOMS
DIARRHEA: 0
CHEST TIGHTNESS: 0
BACK PAIN: 1
SHORTNESS OF BREATH: 0
SORE THROAT: 0
ABDOMINAL PAIN: 0
NAUSEA: 0
TROUBLE SWALLOWING: 0
CONSTIPATION: 0
VOMITING: 0
COLOR CHANGE: 1

## 2022-11-27 ASSESSMENT — PAIN DESCRIPTION - LOCATION
LOCATION: NECK
LOCATION: NECK

## 2022-11-27 ASSESSMENT — PAIN - FUNCTIONAL ASSESSMENT: PAIN_FUNCTIONAL_ASSESSMENT: PREVENTS OR INTERFERES SOME ACTIVE ACTIVITIES AND ADLS

## 2022-11-27 ASSESSMENT — PAIN SCALES - WONG BAKER: WONGBAKER_NUMERICALRESPONSE: 4

## 2022-11-27 NOTE — PROGRESS NOTES
11/27/22 1200   Subjective   Subjective Patient in chair agrees to therapy. Vitals   O2 Device None (Room air)   Transfer Training   Transfer Training Yes   Sit to Stand Stand-by assistance   Stand to Sit Stand-by assistance   Gait Training   Gait Training Yes   Gait   Overall Level of Assistance Stand-by assistance;Contact-guard assistance   Speed/Rachel Slow   Distance (ft) 250 Feet   Assistive Device Walker, rolling   PT Exercises   A/AROM Exercises 20 REPS LAQ, APS   Patient Education   Education Given To Patient   Education Provided Role of Therapy;Plan of Care;Transfer Training; Fall Prevention Strategies   Education Method Verbal   Education Outcome Verbalized understanding;Continued education needed   Other Specialty Interventions   Other Treatments/Modalities Patient in chair;all needs in reach;lunch tray set up.    PT Plan of Care   Sunday X             Electronically signed by Santa Cooper PTA on 11/27/2022 at 12:12 PM

## 2022-11-27 NOTE — PROGRESS NOTES
[facility] Hospitalists      Patient:  Aminata Salazar  YOB: 1965  Date of Service: 11/27/2022  MRN: [id number]   Acct: [de-identified]   Primary Care Physician: Andreia Hanna DO  Advance Directive: Full Code  Admit Date: 11/20/2022       Hospital Day: 7      Patient seen and evaluated and I agree with the plan of care  Dr. Mally Robertson    Portions of this note have been copied forward, however, changed to reflect the most current clinical status of this patient. CHIEF COMPLAINT back spasms and numbness    SUBJECTIVE:  No new complaints today. Mobility improving. Arms still feel cold and numb. Elbow less swollen and red today. CUMULATIVE HOSPITAL COURSE:  Patient is a 59-year-old male with past medical history of tobacco abuse and cervical spinal cord compression who presented to [facility] ED with complaints of worsening neurological symptoms. Patient reported being seen in the outpatient setting by neurosurgery with recommendation for a C3-6 laminectomy with fusion scheduled for 11/29/2022. Patient states he was told to go to the ED for any worsening symptoms. Patient reported having worsening bilateral upper extremity weakness and numbness. Patient also reported having numbness while of the abdomen. Patient reports having more difficulty ambulating. Patient denies issues with bowel and bladder control. Patient was admitted to the hospitalist service with neurosurgery consultation. CT cervical spine without contrast indicated straightening of the normal cervical lordosis, grade 1 anterolisthesis of C2 on C3, C7 on T1, and T2 on T3, grade 1 retrolisthesis of C3 on C4, multilevel advanced degenerative changes of the cervical spine causing severe spinal canal stenosis at C4 C3, varying degrees of neuroforaminal stenosis including severe neuroforaminal stenosis at multiple levels. Due to progression of symptoms neurosurgery recommended proceeding with surgical intervention.   Patient underwent C3-6 decompression laminectomy and posterior fusion on 11/22/2022 with Dr. Flex Martin. Pain medications adjusted by neurosurgery for better pain control. Neurosurgery recommended Hemovac to gravity instead of suction today. Aggressive bowel regimen initiated, Movantik added to bowel regimen. IV pain medication discontinued and tolerating well without. Right elbow pain and swelling, xray unremarkable, US with edema, no abscess. Doxycycline for soft tissue infection. We will continue with PT/OT evaluations for discharge planning, accepted to inpatient rehab pending insurance approval.    Review of Systems:   Review of Systems   Constitutional:  Negative for appetite change, chills, fatigue and fever. HENT:  Negative for sore throat and trouble swallowing. Respiratory:  Negative for chest tightness and shortness of breath. Cardiovascular:  Negative for chest pain, palpitations and leg swelling. Gastrointestinal:  Negative for abdominal pain, constipation, diarrhea, nausea and vomiting. Genitourinary:  Negative for difficulty urinating, dysuria, flank pain, frequency and urgency. Musculoskeletal:  Positive for arthralgias (right elbow) and back pain. Negative for neck pain. Back spasms   Skin:  Positive for color change. Negative for wound. Neurological:  Positive for weakness and numbness. Negative for headaches. Psychiatric/Behavioral:  Negative for sleep disturbance. The patient is nervous/anxious. 14 point review of systems is negative except as specifically addressed above. Objective:   VITALS:  /74   Pulse 82   Temp 98.5 °F (36.9 °C) (Temporal)   Resp 18   Ht 5' 7\" (1.702 m)   Wt 165 lb (74.8 kg)   SpO2 90%   BMI 25.84 kg/m²   24HR INTAKE/OUTPUT:    Intake/Output Summary (Last 24 hours) at 11/27/2022 1126  Last data filed at 11/27/2022 1041  Gross per 24 hour   Intake 740 ml   Output --   Net 740 ml         Physical Exam  Vitals reviewed.    Constitutional: 11/21/2022  Impression: 1. No acute osseous abnormality of the cervical spine. 2.Straightening of the normal cervical lordosis. 3.Grade 1 anterolisthesis of C2 on C3, C7 on T1 and T2 on T3. 4.Grade 1 retrolisthesis of C3 on C4. 4.Multilevel advanced degenerative changes of the cervical spine, causing severe spinal canal stenosis at C3-C4. 5.Varying degrees of neuroforaminal stenosis, including severe neuroforaminal stenosis at multiple levels. Right elbow ULTRASOUND:   CLINICAL HISTORY: Right elbow pain redness and swelling   FINDINGS : Right elbow ultrasound in the area of concern demonstrated   subcutaneous edema . There is no fluid collection or mass seen.        Assessment/Plan   Principal Problem:    Back spasm / Cervical myelopathy (HCC) / Cervical spinal cord compression (HCC)              -neurosurgery on board                          -Postop day 5, C3-6 laminectomy and fusion              -Pain regimen per neurosurgery              -PT/OT               -fall precautions              -bed alarm              -neurovascular checks              -Inpatient rehab consultation, accepted pending insurance approval              -Aggressive bowel regimen, Movantik added     Active Problems:  Right elbow pain   -xray unremarkable   -US edema, no abscess   -doxycycline 100 mg BID for 7 days   -uric acid level unremarkable      Spondylolisthesis of lumbosacral region              -noted, continue current pain management regimen        Tobacco use              -nicoderm patch              -smoking cessation education    CARYN Costa - CNP, 11/27/2022 11:26 AM

## 2022-11-27 NOTE — PROGRESS NOTES
NEUROSURGERY POSTOPERATIVE PROGRESS NOTE      Chief Complaint:   Chief Complaint   Patient presents with    Spasms     Was told to come back if spasms were worsening, is set to have to have surgery on 11/29    Numbness     Patient states that it feels \"numb\" in his abdomen         Date of Surgery: 11/22/2022    Procedure Performed: C3-6 laminectomy and fusion      Interval Update:    No overnight events. Postop pain improving. He is making good progress with PT/OT. HPI:     Patient known to me from recent in-office evaluation with plan for surgery on 11/29. His myelopathic symptoms appear to be worsening. His gait and balance impairment is progressing and he has new numbness in his abdomen. Exam remains significant for bilateral Lyon's, numbness in the hands and ataxic gait. His wife is having difficulty caring for him at home. Objective:    /74   Pulse 82   Temp 98.5 °F (36.9 °C) (Temporal)   Resp 18   Ht 5' 7\" (1.702 m)   Wt 165 lb (74.8 kg)   SpO2 90%   BMI 25.84 kg/m²       Physical Exam:    General: alert, cooperative, no distress, c-collar in place  Cardiorespiratory: unlabored breathing  Wound: C/D/I with staples, no swelling, erythema or fluctuance. Dressing removed. Neurologic Exam:    Mental Status: Alert, oriented, thought content appropriate  Cranial Nerves: PERRL, EOMI, symmetric facies, tongue midline  Motor: 5/5 BUE deltoid, biceps, triceps, 4/5 . 5/5 BLE HF, KE, ADF, APF  Somatosensory: Decreased to light touch in the bilateral hands and feet                Assessment and Plan:    62 y.o. M s/p C3-6 laminectomy and fusion on 11/22/2022 for progressive cervical myelopathy with severe spinal cord compression. He is doing well postoperatively.       - Continue percocet, tizanidine and gabapentin for pain  - OK to remove c-collar for meals and hygiene  - Aggressive bowel regimen  - Mobilize with PT/OT  - Awaiting insurance decision regarding inpatient

## 2022-11-27 NOTE — PROGRESS NOTES
11/27/22 1600   Subjective   Subjective Patient in chair ready to walk. Vitals   O2 Device None (Room air)   Transfer Training   Transfer Training Yes   Sit to Stand Stand-by assistance   Stand to Sit Stand-by assistance   Gait Training   Gait Training Yes   Gait   Overall Level of Assistance Stand-by assistance   Speed/Rachel Slow   Distance (ft) [address], rolling   Patient Education   Education Given To Patient;Caregiver   Education Provided Role of Therapy;Plan of Care;Transfer Training;Family Education; Fall Prevention Strategies   Education Method Verbal;Demonstration   Barriers to Learning None   Education Outcome Verbalized understanding   Other Specialty Interventions   Other Treatments/Modalities Patient in chair all needs in reach;call light.    PT Plan of Care   Sunday X             Electronically signed by Beatrice Whiting PTA on 11/27/2022 at 4:07 PM

## 2022-11-28 VITALS
SYSTOLIC BLOOD PRESSURE: 133 MMHG | DIASTOLIC BLOOD PRESSURE: 85 MMHG | WEIGHT: 165 LBS | HEIGHT: 67 IN | OXYGEN SATURATION: 93 % | BODY MASS INDEX: 25.9 KG/M2 | TEMPERATURE: 97.4 F | HEART RATE: 82 BPM | RESPIRATION RATE: 18 BRPM

## 2022-11-28 PROBLEM — R26.9 ALTERED GAIT: Status: ACTIVE | Noted: 2022-11-28

## 2022-11-28 LAB
ANION GAP SERPL CALCULATED.3IONS-SCNC: 12 MMOL/L (ref 7–19)
BASOPHILS ABSOLUTE: 0 K/UL (ref 0–0.2)
BASOPHILS RELATIVE PERCENT: 0.4 % (ref 0–1)
BUN BLDV-MCNC: 22 MG/DL (ref 6–20)
CALCIUM SERPL-MCNC: 8.8 MG/DL (ref 8.6–10)
CHLORIDE BLD-SCNC: 98 MMOL/L (ref 98–111)
CO2: 23 MMOL/L (ref 22–29)
CREAT SERPL-MCNC: 0.5 MG/DL (ref 0.5–1.2)
EOSINOPHILS ABSOLUTE: 0.1 K/UL (ref 0–0.6)
EOSINOPHILS RELATIVE PERCENT: 1.4 % (ref 0–5)
GFR SERPL CREATININE-BSD FRML MDRD: >60 ML/MIN/{1.73_M2}
GLUCOSE BLD-MCNC: 96 MG/DL (ref 74–109)
HCT VFR BLD CALC: 36.3 % (ref 42–52)
HEMOGLOBIN: 12 G/DL (ref 14–18)
IMMATURE GRANULOCYTES #: 0 K/UL
LYMPHOCYTES ABSOLUTE: 1.7 K/UL (ref 1.1–4.5)
LYMPHOCYTES RELATIVE PERCENT: 22.5 % (ref 20–40)
MCH RBC QN AUTO: 31.3 PG (ref 27–31)
MCHC RBC AUTO-ENTMCNC: 33.1 G/DL (ref 33–37)
MCV RBC AUTO: 94.8 FL (ref 80–94)
MONOCYTES ABSOLUTE: 1.2 K/UL (ref 0–0.9)
MONOCYTES RELATIVE PERCENT: 16 % (ref 0–10)
NEUTROPHILS ABSOLUTE: 4.4 K/UL (ref 1.5–7.5)
NEUTROPHILS RELATIVE PERCENT: 59.3 % (ref 50–65)
PDW BLD-RTO: 13.3 % (ref 11.5–14.5)
PLATELET # BLD: 153 K/UL (ref 130–400)
PMV BLD AUTO: 11.6 FL (ref 9.4–12.4)
POTASSIUM REFLEX MAGNESIUM: 4.3 MMOL/L (ref 3.5–5)
RBC # BLD: 3.83 M/UL (ref 4.7–6.1)
SODIUM BLD-SCNC: 133 MMOL/L (ref 136–145)
WBC # BLD: 7.4 K/UL (ref 4.8–10.8)

## 2022-11-28 PROCEDURE — 6370000000 HC RX 637 (ALT 250 FOR IP): Performed by: NEUROLOGICAL SURGERY

## 2022-11-28 PROCEDURE — 97116 GAIT TRAINING THERAPY: CPT

## 2022-11-28 PROCEDURE — 99024 POSTOP FOLLOW-UP VISIT: CPT | Performed by: NEUROLOGICAL SURGERY

## 2022-11-28 PROCEDURE — 80048 BASIC METABOLIC PNL TOTAL CA: CPT

## 2022-11-28 PROCEDURE — 6370000000 HC RX 637 (ALT 250 FOR IP): Performed by: HOSPITALIST

## 2022-11-28 PROCEDURE — 6370000000 HC RX 637 (ALT 250 FOR IP): Performed by: NURSE PRACTITIONER

## 2022-11-28 PROCEDURE — 94760 N-INVAS EAR/PLS OXIMETRY 1: CPT

## 2022-11-28 PROCEDURE — 36415 COLL VENOUS BLD VENIPUNCTURE: CPT

## 2022-11-28 PROCEDURE — 85025 COMPLETE CBC W/AUTO DIFF WBC: CPT

## 2022-11-28 RX ORDER — FOLIC ACID 1 MG/1
1 TABLET ORAL DAILY
Qty: 30 TABLET | Refills: 3 | Status: SHIPPED | OUTPATIENT
Start: 2022-11-29

## 2022-11-28 RX ORDER — DOXYCYCLINE HYCLATE 100 MG/1
100 CAPSULE ORAL EVERY 12 HOURS SCHEDULED
Qty: 11 CAPSULE | Refills: 0 | Status: SHIPPED | OUTPATIENT
Start: 2022-11-28 | End: 2022-12-04

## 2022-11-28 RX ORDER — OXYCODONE HCL 10 MG/1
10 TABLET, FILM COATED, EXTENDED RELEASE ORAL EVERY 12 HOURS SCHEDULED
Qty: 6 TABLET | Refills: 0 | Status: SHIPPED | OUTPATIENT
Start: 2022-11-28 | End: 2022-12-01

## 2022-11-28 RX ORDER — FERROUS SULFATE 325(65) MG
325 TABLET ORAL
Qty: 30 TABLET | Refills: 3 | Status: SHIPPED | OUTPATIENT
Start: 2022-11-29

## 2022-11-28 RX ORDER — OXYCODONE AND ACETAMINOPHEN 10; 325 MG/1; MG/1
1 TABLET ORAL EVERY 6 HOURS PRN
Qty: 12 TABLET | Refills: 0 | Status: SHIPPED | OUTPATIENT
Start: 2022-11-28 | End: 2022-12-01

## 2022-11-28 RX ORDER — GABAPENTIN 600 MG/1
600 TABLET ORAL
Qty: 15 TABLET | Refills: 0 | Status: SHIPPED | OUTPATIENT
Start: 2022-11-28 | End: 2022-12-01

## 2022-11-28 RX ORDER — FAMOTIDINE 20 MG/1
20 TABLET, FILM COATED ORAL 2 TIMES DAILY
Qty: 60 TABLET | Refills: 3 | Status: SHIPPED | OUTPATIENT
Start: 2022-11-28

## 2022-11-28 RX ORDER — OXYCODONE HCL 10 MG/1
10 TABLET, FILM COATED, EXTENDED RELEASE ORAL EVERY 12 HOURS SCHEDULED
Status: DISCONTINUED | OUTPATIENT
Start: 2022-11-28 | End: 2022-11-28 | Stop reason: HOSPADM

## 2022-11-28 RX ADMIN — GABAPENTIN 600 MG: 600 TABLET, FILM COATED ORAL at 06:02

## 2022-11-28 RX ADMIN — OXYCODONE AND ACETAMINOPHEN 2 TABLET: 10; 325 TABLET ORAL at 02:50

## 2022-11-28 RX ADMIN — OXYCODONE AND ACETAMINOPHEN 1 TABLET: 10; 325 TABLET ORAL at 12:51

## 2022-11-28 RX ADMIN — FOLIC ACID 1 MG: 1 TABLET ORAL at 08:12

## 2022-11-28 RX ADMIN — GABAPENTIN 600 MG: 600 TABLET, FILM COATED ORAL at 08:11

## 2022-11-28 RX ADMIN — TIZANIDINE 4 MG: 4 TABLET ORAL at 06:02

## 2022-11-28 RX ADMIN — OXYCODONE HYDROCHLORIDE 10 MG: 10 TABLET, FILM COATED, EXTENDED RELEASE ORAL at 10:06

## 2022-11-28 RX ADMIN — SENNOSIDES AND DOCUSATE SODIUM 2 TABLET: 50; 8.6 TABLET ORAL at 08:12

## 2022-11-28 RX ADMIN — DOXYCYCLINE HYCLATE 100 MG: 100 CAPSULE ORAL at 08:12

## 2022-11-28 RX ADMIN — NALOXEGOL OXALATE 12.5 MG: 12.5 TABLET, FILM COATED ORAL at 06:02

## 2022-11-28 RX ADMIN — POLYETHYLENE GLYCOL 3350 17 G: 17 POWDER, FOR SOLUTION ORAL at 08:11

## 2022-11-28 RX ADMIN — Medication 50 MCG: at 08:12

## 2022-11-28 RX ADMIN — OXYCODONE AND ACETAMINOPHEN 1 TABLET: 10; 325 TABLET ORAL at 08:12

## 2022-11-28 RX ADMIN — FAMOTIDINE 20 MG: 20 TABLET ORAL at 08:12

## 2022-11-28 RX ADMIN — GABAPENTIN 600 MG: 600 TABLET, FILM COATED ORAL at 14:45

## 2022-11-28 RX ADMIN — FERROUS SULFATE TAB 325 MG (65 MG ELEMENTAL FE) 325 MG: 325 (65 FE) TAB at 08:12

## 2022-11-28 ASSESSMENT — PAIN SCALES - GENERAL
PAINLEVEL_OUTOF10: 8
PAINLEVEL_OUTOF10: 7
PAINLEVEL_OUTOF10: 8

## 2022-11-28 ASSESSMENT — PAIN - FUNCTIONAL ASSESSMENT
PAIN_FUNCTIONAL_ASSESSMENT: PREVENTS OR INTERFERES SOME ACTIVE ACTIVITIES AND ADLS
PAIN_FUNCTIONAL_ASSESSMENT: ACTIVITIES ARE NOT PREVENTED

## 2022-11-28 ASSESSMENT — PAIN DESCRIPTION - ORIENTATION
ORIENTATION: POSTERIOR
ORIENTATION: POSTERIOR
ORIENTATION: POSTERIOR;RIGHT;LEFT
ORIENTATION: POSTERIOR

## 2022-11-28 ASSESSMENT — PAIN DESCRIPTION - LOCATION
LOCATION: NECK;SHOULDER
LOCATION: BACK
LOCATION: HEAD;NECK;BACK
LOCATION: NECK;HEAD
LOCATION: HEAD;NECK

## 2022-11-28 ASSESSMENT — PAIN DESCRIPTION - PAIN TYPE: TYPE: ACUTE PAIN

## 2022-11-28 ASSESSMENT — PAIN DESCRIPTION - DESCRIPTORS
DESCRIPTORS: SORE;SHOOTING
DESCRIPTORS: SORE
DESCRIPTORS: SHOOTING;SORE
DESCRIPTORS: SORE;SHOOTING
DESCRIPTORS: ACHING;JABBING

## 2022-11-28 ASSESSMENT — PAIN DESCRIPTION - FREQUENCY: FREQUENCY: INTERMITTENT

## 2022-11-28 NOTE — CARE COORDINATION
Patient Information    Patient Name   Rhys Sampson ([id number]) Legal Sex   Male    1965   Room Bed   0538 [id number]       Patient Demographics    Address   [address] [address] Phone   [phone] [facility]) E-mail Address   [email or web address]       Social Security Number    JOSE J          Basic Information    Date Of Birth   1965 Legal Sex   Male Race   White (non-) Ethnic Group   Non- / Non  Preferred Language   English Preferred Written Language   English       Admission Information      Current Information    Attending Provider Admitting Provider Admission Type Admission Status   Yusef Campbell DO  Emergency Confirmed Admission          Admission Date/Time Discharge Date Hospital Service Auth/Cert Status     10:32 AM  Internal Medicine [facility]          Hospital Area Unit Room/Bed    24 [facility] 5 SURG SERVICES 33                  Admission    Complaint          PCP and Center    Primary Care Provider   Santa Burk DO Phone   [address]         Payor Information             PRIMARY INSURANCE   Payor: [facility] MEDICARE Plan: Jacob GAITAN*   Group Number: [facility] Type: INDEMNITY   Subscriber Name: Teresa Kulkarni : 1965   Subscriber ID: [id number]       [facility] [place]. Rel. to Subscriber: Self       SECONDARY INSURANCE   Payor:   Plan:     Group Number:   Insurance Type:     Subscriber Name:   Subscriber :     Subscriber ID:         Pat.  Rel. to Subscriber:                  Documents on File     Status Date Received Description   Documents for the Patient   (OLD) [facility]) Physician Consent and NPP Not Received     Photo ID      Insurance Card      Physician Office Consent for Treatment Not Received     ACP-Advance Directive Not Received     ACP-Power of  Not Received     Financial Responsibility Not Received Financial Assistance Program Applications Not Received     [employer] Adult Proxy Access Not Received     [employer] Child Proxy Access Not Received     ACO Consent for the Release of  Confidential Alcohol or Drug Treatment Information Not Received     ACO Declining to [facility] Not Received     Recurring Hospital Consent and NPP Not Received     Physician Communication Release NPP Not Received     Physician Consent and NPP Not Received     Outside Record Received 21 Referral Records from Dr. Jose Eduardo Denton    ACP-Do Not Resuscitate      HIM MICK Authorization Not Received     ACP-MOST      ACP-MOLST      ACP-POST      ACP-POLST      Patient Administration Not Received     Research Consent Not Received     Public Benefits Application Not Received     Public Benefits Support Not Received     Prescription Card Not Received     Hospital Recurring Universal Patient Consent Not Received     Insurance Card Not Received 22 did not bring   Photo ID Not Received 22 did not bring   HIM MICK Authorization  11/10/22 ER 22   Consents Not Received     Insurance      CE Auth Form (Scanned)      [facility] [place] Patient Consent Not Received     Photo ID Received 22    Insurance Card Received 22    Amb[facility] [place] Patient Consent Signed 22    Text Reminder Consent Received 22    Patient Administration Received 22 SURGERY INSTRUCTIONS GIVEN TO PT   Patient Administration Received 22 SURGERY SCHEDULING   Physician Communication Release NPP Received [address] Physician Communication Release NPP   Patient Administration Received 22 UPDATED SURGERY SCHEDULING   CE Prospective Auth Received () 18 Authorization Document from [address] [place] [address] ([facility])   System-Retrieved CE Auth Form Received () 18 External Authorization Form from [address] [place] [address] ([facility])   CE Prospective Auth Received () 18 Authorization Document from [employer] ([facility])   System-Retrieved CE Auth Form Received () 18 External Authorization Form from [employer] ([facility])   Documents for the [employer] Important Message Received 22    Medicare Outpatient Observation Notice Not Received     IMM - Medicare Second Copy Given to Pt      ABN Waiver Not Received     Administrative Not Received     Order Not Received     Notice of Non Coverage Not Received     No Surprises Billing Act Not Received     Coordination of Benefit Not Received     Coverage COB Information Not Received     [facility] [place] Patient Consent Not Received     [facility] [place] Patient Consent Received 22    Coverage COB Information Received 22 PT UNABLE TO SIGN DUE TO MED COND.    Procedure/Surgery Received 22    Insurance Authorization Received 22 Insurance Authorization   Clinical References Attachment   Back Pain (English)   Clinical References Attachment   Low Back Pain: General Info (English)   Clinical References Attachment   Opioids - Long-Acting: Safe Use: General Info (English)   Clinical References Attachment   Smoking Cessation: Health Benefits: General Info (English)       Medical Problems  Comment          Hospital Problem List  Never Reviewed     ICD-10-CM Priority Class Noted POA    Back spasm M62.830 Medium  2022 Yes   Cervical myelopathy ([address] G95.9 Medium  2022 Yes   Cervical spinal cord compression ([address] G95.20 Medium  2022 Yes   Spondylolisthesis of lumbosacral region M43.17 Medium  2022 Yes   Tobacco use Z72.0 Medium  2022 Yes   Altered gait R26.9 Medium  2022 Yes     [id number]        CSN                                    Req/Control # [Problem retrieving Specimen ID]                                   Order Date:  2022  [id number]                                          Patient Information      Name:  Lilaina Rosa Komal Parra  :  1965  Age:  62 y.o. Address:  [address] [place] [address]   Zip:  [address]  PCP: Jaime Chatman DO Sex:  M  SSN: [id number]  Home Phone: [phone]  Work Phone:    Patient MRN:  [id number]    Alt Patient ID:  [id number]  PCP Phone: [phone]       Authorizing Provider Information       AUTHORIZING PROVIDER: CARYN Albert  Physician ID: [id number]  NPI:  [id number]  Site:   Address: [address] [place] [id number]  [address] [place] [address]  Phone: [phone]  Fax: [phone]             EQUIPMENT ORDERED  DME Order for Oletta Claude (ORD   #:   [id number]) Priority  Routine Class  Hospital Performed        Associated Diagnosis:          Comments: You must complete the order parameters below and add the medical necessity documentation for this DME in a separate note.      Jeanne Walker     Current patient weight: Weight: 165 lb (74.8 kg)  Current patient height: Height: 5' 7\" (170.2 cm)  Diagnosis: Altered gait  Duration: Purchase            Scheduling Instructions:                                 Specimen Source             Collection Date    Collection Time    Order Status    Expected Date                 Electronically Signed By  CARYN Albert  NPI:  [id number] Date  2022  2:45 PM              Responsible Party [place] Information     Guar-ActID   Relationship Account Type Home Phone   [address] - *  [address] / [place] [address] Self P/F [phone]   Employer   Work Phone   [address] [facility] [address]     Primary Insurance  Insurance/Subscriber ID:  [id number]  [facility] [address] Name:  [address]              Relationship to Patient: SelfSigned ABN: N    Payor Name:  Betty Feldman   Plan:  [employer]HE[employer] MEDIBLUE ESSENTIAL/PLUS   Group: [id number]  Worker's Comp Date of Injury:

## 2022-11-28 NOTE — PROGRESS NOTES
NEUROSURGERY POSTOPERATIVE PROGRESS NOTE      Chief Complaint:   Chief Complaint   Patient presents with    Spasms     Was told to come back if spasms were worsening, is set to have to have surgery on 11/29    Numbness     Patient states that it feels \"numb\" in his abdomen         Date of Surgery: 11/22/2022    Procedure Performed: C3-6 laminectomy and fusion      Interval Update:    Patient had a protracted argument with his spouse overnight that resulted in her being removed from the facility. He has also been asking for pain medication more-frequently than prescribed. Otherwise he is doing well. He is mobilizing well with physical therapy and becoming more independent with ADLs. HPI:     Patient known to me from recent in-office evaluation with plan for surgery on 11/29. His myelopathic symptoms appear to be worsening. His gait and balance impairment is progressing and he has new numbness in his abdomen. Exam remains significant for bilateral Lyon's, numbness in the hands and ataxic gait. His wife is having difficulty caring for him at home. Objective:    /85   Pulse 82   Temp 97.4 °F (36.3 °C) (Temporal)   Resp 18   Ht 5' 7\" (1.702 m)   Wt 165 lb (74.8 kg)   SpO2 93%   BMI 25.84 kg/m²       Physical Exam:    General: alert, cooperative, no distress, c-collar in place  Cardiorespiratory: unlabored breathing  Wound: C/D/I with staples, no swelling, erythema or fluctuance. Neurologic Exam:    Mental Status: Alert, oriented, thought content appropriate  Cranial Nerves: PERRL, EOMI, symmetric facies, tongue midline  Motor: 5/5 BUE deltoid, biceps, triceps, 4/5 . 5/5 BLE HF, KE, ADF, APF  Somatosensory: Decreased to light touch in the bilateral hands and feet                Assessment and Plan:    62 y.o. M s/p C3-6 laminectomy and fusion on 11/22/2022 for progressive cervical myelopathy with severe spinal cord compression. He is doing well postoperatively.       - Will add oxycontin 10mg BID and change PRN percocet to 1 tab q4h.   Continue tizanidine and gabapentin  - OK to remove c-collar for meals and hygiene  - Aggressive bowel regimen  - Mobilize with PT/OT  - Awaiting insurance decision regarding inpatient rehab, OK to DC home with home health if insurance declines or patient prefers      Electronically signed by Dasha Sunshine MD on 11/28/2022 at 8:39 AM

## 2022-11-28 NOTE — DISCHARGE SUMMARY
Hirocarolina Distance  :  1965  MRN:  [id number]    Admit date:  2022  Discharge date:  2022    Discharging Physician:  Dr. Bozena Solis Directive: Full Code    Consults: Gabriel Corrigan TO REHAB/TCU ADMISSION COORDINATOR  IP CONSULT TO SOCIAL WORK  IP CONSULT TO SOCIAL WORK  IP CONSULT TO HOME CARE NEEDS     Primary Care Physician:  Elena Peralta, DO    Discharge Diagnoses:  Principal Problem:    Back spasm  Active Problems:    Cervical myelopathy ([address]    Cervical spinal cord compression (HCC)    Spondylolisthesis of lumbosacral region    Tobacco use    Altered gait  Resolved Problems:    * No resolved hospital problems. *      Portions of this note have been copied forward, however, changed to reflect the most current clinical status of this patient. Hospital Course:   Patient is a 19-year-old male with a PMH of tobacco abuse and cervical spinal cord compression who presented to [facility] ED on 2022 with complaints of worsening neurological symptoms. Patient reported being seen in the outpatient setting by neurosurgery with recommendation for a C3-6 laminectomy with fusion scheduled for 2022. Patient stated he was told to go to the ED for any worsening symptoms. Patient reported having worsening bilateral upper extremity weakness and numbness. Patient also reported having numbness of the abdomen. Patient reported having increased difficulty ambulating. Patient denied issues with bowel and bladder control. Patient was admitted to the hospitalist service with neurosurgery consultation.   CT cervical spine without contrast indicated straightening of the normal cervical lordosis, grade 1 anterolisthesis of C2 on C3, C7 on T1, and T2 on T3, grade 1 retrolisthesis of C3 on C4, multilevel advanced degenerative changes of the cervical spine causing severe spinal canal stenosis at C4 C3, varying degrees of neuroforaminal stenosis including severe neuroforaminal stenosis at multiple levels. Due to progression of symptoms neurosurgery proceeded with surgical intervention. Patient underwent C3-6 decompression laminectomy and posterior fusion on 11/22/2022 with Dr. Suni Auguste. Patient has been transitioned to oral pain medication without difficulties. Right elbow pain and swelling-x-ray performed on 11/25/2022 showed no evidence of fracture or dislocation. Ultrasound of the right extremity showed subcutaneous edema-doxycycline started with improvement of inflammation and erythema  He has been actively working with PT/OT. He had plan to go to the eighth floor acute inpatient rehab, however, neurosurgery has cleared the patient for discharge home with home health. He prefers to go home with home health. Walker ordered. He will be going [facility] with [facility] for PT/OT/home health aide for ADLs. His labs are within normal limits. His vital signs are stable. He is medically and clinically stable for discharge. He is to follow-up with Dr. Suni Auguste in 2 weeks for staple removal.  He is to follow-up with his PCP within 1 week for hospital discharge follow-up. He will be discharged home with home health in stable condition. Significant Diagnostic Studies:   CT CERVICAL SPINE WO CONTRAST    Result Date: 11/21/2022  CT OF THE CERVICAL SPINE WITHOUT CONTRAST Technique: Spiral CT of the cervical spine was performed without contrast. Coronal and sagittal reformatted images were reviewed. History: Planning for surgery Comparison: MRI cervical spine November 8, 2022 Findings: There is straightening of the normal cervical lordosis. There is mild levo scoliotic curvature of the cervical spine. The vertebral bodies demonstrate normal height without evidence of compression deformity. There is grade 1 anterolisthesis of C2 on C3 and C7 on T1 as well as T2 on T3. There is grade 1 retrolisthesis of C3 on C4.  There is severe loss of disc space height at C3-C4 with moderate loss of disc space height from C4-C5 through C6-C7. Multilevel facet arthropathy is present. Chronic anterior osteophyte formation is noted at multiple levels. Secretions are suspected in the upper thoracic trachea. Examination of the individual levels demonstrates: C2-C3: There is moderate left neural foraminal stenosis related to facet arthropathy and uncovertebral joint hypertrophy. No spinal canal stenosis is seen. C3-C4: Disc osteophyte complex is present, causing severe spinal canal stenosis. There is severe bilateral neural foraminal stenosis related to uncovertebral joint hypertrophy and facet arthropathy. C4-C5: Disc osteophyte complex is present, causing moderate to severe spinal canal stenosis. There is severe bilateral neural foraminal stenosis related to uncovertebral joint hypertrophy and facet arthropathy. C5-C6: Disc osteophyte complex is present, causing moderate to severe spinal canal stenosis. There is severe left and moderate to severe right neural foraminal stenosis related to uncovertebral joint hypertrophy and facet arthropathy. C6-C7: Disc osteophyte complex causes moderate spinal canal stenosis. There is severe left and moderate right neural foraminal stenosis related to uncovertebral joint hypertrophy. C7-T1: No significant spinal canal or neural foraminal stenosis. Impression: 1. No acute osseous abnormality of the cervical spine. 2.Straightening of the normal cervical lordosis. 3.Grade 1 anterolisthesis of C2 on C3, C7 on T1 and T2 on T3. 4.Grade 1 retrolisthesis of C3 on C4. 4.Multilevel advanced degenerative changes of the cervical spine, causing severe spinal canal stenosis at C3-C4. 5.Varying degrees of neuroforaminal stenosis, including severe neuroforaminal stenosis at multiple levels.       Pertinent Labs:   CBC:   Recent Labs     11/26/22  0313 11/27/22  0342 11/28/22  0245   WBC 9.1 8.2 7.4   HGB 12.5* 11.4* 12.0*    166 153     BMP:    Recent Labs     11/26/22 0313 11/27/22  0342 11/28/22  0245   * 133* 133*   K 4.2 4.3 4.3   CL 97* 98 98   CO2 25 24 23   BUN 16 20 22*   CREATININE 0.6 0.6 0.5   GLUCOSE 130* 114* 96     INR: No results for input(s): INR in the last 72 hours. Physical Exam:  Vital Signs: /85   Pulse 82   Temp 97.4 °F (36.3 °C) (Temporal)   Resp 18   Ht 5' 7\" (1.702 m)   Wt 165 lb (74.8 kg)   SpO2 93%   BMI 25.84 kg/m²   Physical Exam  Vitals and nursing note reviewed. Constitutional:       General: He is not in acute distress. Appearance: Normal appearance. He is ill-appearing. HENT:      Head: Normocephalic and atraumatic. Right Ear: External ear normal.      Left Ear: External ear normal.      Nose: Nose normal.      Mouth/Throat:      Mouth: Mucous membranes are moist.   Eyes:      Extraocular Movements: Extraocular movements intact. Conjunctiva/sclera: Conjunctivae normal.      Pupils: Pupils are equal, round, and reactive to light. Neck:      Comments: Hard c-collar in place  Cardiovascular:      Rate and Rhythm: Normal rate and regular rhythm. Pulses: Normal pulses. Heart sounds: Normal heart sounds. Pulmonary:      Effort: Pulmonary effort is normal. No respiratory distress. Breath sounds: Normal breath sounds. No wheezing, rhonchi or rales. Abdominal:      General: Bowel sounds are normal. There is no distension. Palpations: Abdomen is soft. Tenderness: There is no abdominal tenderness. Musculoskeletal:         General: No swelling, tenderness or deformity. Right elbow: Swelling present. Decreased range of motion. Cervical back: Normal range of motion and neck supple. No muscular tenderness. Right lower leg: No edema. Left lower leg: No edema. Comments: Improving   Skin:     General: Skin is warm and dry. Findings: Bruising and erythema present. No lesion. Neurological:      Mental Status: He is alert and oriented to person, place, and time. Sensory: Sensory deficit present. Motor: Weakness present. Gait: Gait abnormal (Ambulating with walker). Psychiatric:         Mood and Affect: Mood is anxious. Discharge Medications:         Medication List        START taking these medications      cyanocobalamin 50 MCG tablet  Take 1 tablet by mouth daily  Start taking on: November 29, 2022     doxycycline hyclate 100 MG capsule  Commonly known as: VIBRAMYCIN  Take 1 capsule by mouth every 12 hours for 11 doses     famotidine 20 MG tablet  Commonly known as: PEPCID  Take 1 tablet by mouth 2 times daily     ferrous sulfate 325 (65 Fe) MG tablet  Commonly known as: IRON 325  Take 1 tablet by mouth daily (with breakfast)  Start taking on: November 29, 0956     folic acid 1 MG tablet  Commonly known as: FOLVITE  Take 1 tablet by mouth daily  Start taking on: November 29, 2022     gabapentin 600 MG tablet  Commonly known as: NEURONTIN  Take 1 tablet by mouth 5 times daily for 3 days. Replaces: gabapentin 400 MG capsule     naloxegol 25 MG Tabs tablet  Commonly known as: Movantik  Take 1 tablet by mouth every morning (before breakfast)     oxyCODONE 10 MG extended release tablet  Commonly known as: OXYCONTIN  Take 1 tablet by mouth every 12 hours for 3 days. CONTINUE taking these medications      meloxicam 7.5 MG tablet  Commonly known as: MOBIC     oxyCODONE-acetaminophen  MG per tablet  Commonly known as: PERCOCET  Take 1 tablet by mouth every 6 hours as needed for Pain for up to 3 days.      tiZANidine 4 MG tablet  Commonly known as: Linh Manual taking these medications      gabapentin 400 MG capsule  Commonly known as: NEURONTIN  Replaced by: gabapentin 600 MG tablet     methylPREDNISolone 4 MG tablet  Commonly known as: MEDROL (ALIYAH)               Where to Get Your Medications        These medications were sent to [address] [facility] [address] , [address] [place] [address]      Phone: [phone]   cyanocobalamin 50 MCG tablet  doxycycline hyclate 100 MG capsule  famotidine 20 MG tablet  ferrous sulfate 325 (65 Fe) MG tablet  folic acid 1 MG tablet  gabapentin 600 MG tablet  naloxegol 25 MG Tabs tablet  oxyCODONE 10 MG extended release tablet  oxyCODONE-acetaminophen  MG per tablet         Discharge Instructions: Follow up with Kennedi Pollard DO  as scheduled within 1 week of discharge  Follow up as scheduled with Dr. Tiffany Amanda in approximately 2 weeks  Take medications as directed. Resume activity as tolerated. Diet: ADULT DIET; Regular; No Caffeine     Disposition: Patient is Stableand will be discharged to Home with [address] [place] [address]. Time spent on discharge 38 minutes spent in assessing patient, reviewing medications, discussion with nursing, confirming safe discharge plan and preparation of discharge summary.     Signed:  CARYN Argueta, 11/28/2022 3:29 PM

## 2022-11-28 NOTE — PROGRESS NOTES
Patient and his wife having another dispute, wife was told she needs to leave at this time for being disruptive towards other patients and disruptive to his care, wife argumentative with staff about staying, security called and wife was told that she is not allowed back in patients room while he is here admitted. Wife left floor to find ride.

## 2022-11-28 NOTE — PROGRESS NOTES
Patient received percocet at [phone] with the frequency of every 4 hours. At 0600 patient had received his morning gabapentin and Zanaflex, patient then started questioning RN about why he is not receiving more percocet, RN explained to patient it is not due at this time he has another hour, patient did not believe nurse and wanted to speak with charge nurse about being withheld from pain medication. Charge nurse then had to explain to patient that it is indeed not time for his pain medication and that the RN is not withholding.

## 2022-11-28 NOTE — DISCHARGE INSTRUCTIONS
No driving while on narcotic pain medications and until released to drive by Dr Elie Collet  No bending lifting or twisting  Wear collar as directed - OK to remove c-collar for meals and hygiene  Report new onset of fever redness swelling or drainage at surgical site  No tub bathing or submerging in water   Avoid constipation

## 2022-11-28 NOTE — CARE COORDINATION
2nd IMM Letter given to patient. Patient states understanding for the 4 hours wait prior to discharge to appeal. He does not want to stay. Reviewed, discussed, answered questions, and signed by patient. Signed copy placed in patient's chart.      11/28/22 1523   IMM Letter   IMM Letter given to Patient/Family/Significant other/Guardian/POA/by: given to patient by Harris Veliz RN BSN    IMM Letter date given: 11/28/22   IMM Letter time given: 1520   Electronically signed by Yaya Borja RN, BSN on 11/28/2022 at 3:25 PM

## 2022-11-28 NOTE — PROGRESS NOTES
Patients wife states that patient is agitated with her due to her telling staff that Robert Rae is pocketing pain pills, when given 2 percocets he is only swallowing one and putting other one in container. \"

## 2022-11-28 NOTE — PROGRESS NOTES
Patient and wife continued to argue through out night. Patient is agitated with wife, both parties have been heard from nurses station yelling at each other. Staff advised wife to leave but wife states that she is unable to drive due to seizures nor does she have anyone to call to come get her. Staff offered her places to get away from patient however she declines and goes back into room with patient. Wife continues to come to nurses station to complain about patients behavior and agitation however is unsettled when advised to not return to patients room and continues to return to room.

## 2022-11-28 NOTE — PROGRESS NOTES
Physical Therapy  Name: Aminata Salazar  MRN:  [id number]  Date of service:  11/28/2022 11/28/22 1145   Restrictions/Precautions   Restrictions/Precautions Fall Risk;ROM Restrictions;Surgical Protocols   Required Braces or Orthoses? Yes   Required Braces or Orthoses   Cervical c-collar   Position Activity Restriction   Spinal Precautions No Bending; No Lifting; No Twisting   Other position/activity restrictions cervical collar   General   Chart Reviewed Yes   Subjective   Subjective Pt up in recliner, agrees to walk. Pain Assessment   Pain Assessment 0-10   Pain Level 8   Pain Location Neck; Shoulder   Pain Orientation Posterior;Right;Left   Pain Descriptors Sore   Functional Pain Assessment Prevents or interferes some active activities and ADLs   Pain Type Acute pain   Pain Frequency Intermittent   Transfers   Sit to Stand Contact guard assistance   Stand to Sit Contact guard assistance   Ambulation   Surface Level tile   Device Rolling Walker   Assistance Contact guard assistance   Quality of Gait unsteady, ataxic   Gait Deviations Slow Rachel;Decreased step length;Decreased step height   Distance 200'   Short Term Goals   Time Frame for Short Term Goals 2 wks   Short Term Goal 1 supine to sit indep   Short Term Goal 2 sit to stand indep   Short Term Goal 3 amb. 200' with RW SBA   Conditions Requiring Skilled Therapeutic Intervention   Body Structures, Functions, Activity Limitations Requiring Skilled Therapeutic Intervention Decreased functional mobility ; Decreased strength;Decreased balance;Decreased posture; Increased pain;Decreased safe awareness;Decreased cognition;Decreased ROM; Decreased body mechanics; Decreased sensation;Decreased coordination;Decreased fine motor control   Assessment Pt tolerated ambulation well with rwx, cont to exhibit some ataxia and discoordination but no LOB noted. Pt returned to recliner post gait.    Activity Tolerance   Activity Tolerance Patient tolerated treatment well   PT Plan of Care   Monday X   Safety Devices   Type of Devices Call light within reach;Gait belt;Left in chair         Electronically signed by Beltran Neil PTA on 11/28/2022 at 11:48 AM

## 2022-11-28 NOTE — CARE COORDINATION
Spoke with patient regarding MD orders for [facility] services. Patient agreeable and has chosen [facility]. Referral Faxed. [address] [place] [address] [phone]. [phone]. Please notify [address] [place] [address] when patient discharges and fax DC Summary,  DC med list and any new [facility] orders. The Patient and/or patient representative was provided with a choice of provider and agrees   with the discharge plan. [x] Yes [] No    Freedom of choice list was provided with basic dialogue that supports the patient's individualized plan of care/goals, treatment preferences and shares the quality data associated with the providers.  [x] Yes [] No  Electronically signed by Alexia Hoover on 11/28/2022 at 3:10 PM

## 2022-11-28 NOTE — DISCHARGE INSTR - DIET
Good nutrition is important when healing from an illness, injury, or surgery. Follow any nutrition recommendations given to you during your hospital stay. If you were given an oral nutrition supplement while in the hospital, continue to take this supplement at home. You can take it with meals, in-between meals, and/or before bedtime. These supplements can be purchased at most local grocery stores, pharmacies, and chain [employer]-stores. If you have any questions about your diet or nutrition, call the hospital and ask for the dietitian.     Regular no caffeine

## 2022-11-29 ENCOUNTER — TELEPHONE (OUTPATIENT)
Dept: NEUROSURGERY | Age: 57
End: 2022-11-29

## 2022-11-29 NOTE — TELEPHONE ENCOUNTER
Patient wife called stating that when he was d/c from the hospital yesterday, they only wrote his percocet prescription for 1 tablet every 6 hours but he was taking 2 tablets every 4 hours in the hospital. She states that pt is in so much pain, that he cant take only one every 6 hours. She would like a new prescription with dosage change sent to pharmacy. [facility] [place] nurse stated that patient has a 102 fever. Patient wife is calling PCP today to ask for nicotine patch and to tell them about the fever as well.

## 2022-11-30 ENCOUNTER — TELEPHONE (OUTPATIENT)
Dept: NEUROLOGY | Age: 57
End: 2022-11-30

## 2022-11-30 NOTE — TELEPHONE ENCOUNTER
Home health nurse for occupational therapy called stating she was concerned because Mr Isela Zavala was getting into a bath tub without his collar on.  Spoke with Dr. Neri Banuelos and advised to home health nurse patient needs to be wearing collar when getting in and out of bathtub and patient not to submerge incision in water

## 2022-12-05 ENCOUNTER — OFFICE VISIT (OUTPATIENT)
Dept: NEUROSURGERY | Age: 57
End: 2022-12-05

## 2022-12-05 VITALS
HEIGHT: 67 IN | DIASTOLIC BLOOD PRESSURE: 82 MMHG | WEIGHT: 165 LBS | BODY MASS INDEX: 25.9 KG/M2 | HEART RATE: 77 BPM | RESPIRATION RATE: 18 BRPM | SYSTOLIC BLOOD PRESSURE: 125 MMHG

## 2022-12-05 DIAGNOSIS — G95.9 CERVICAL MYELOPATHY (HCC): Primary | ICD-10-CM

## 2022-12-05 DIAGNOSIS — Z98.1 S/P CERVICAL SPINAL FUSION: ICD-10-CM

## 2022-12-05 PROCEDURE — 99024 POSTOP FOLLOW-UP VISIT: CPT | Performed by: NEUROLOGICAL SURGERY

## 2022-12-05 RX ORDER — OXYCODONE AND ACETAMINOPHEN 10; 325 MG/1; MG/1
1 TABLET ORAL EVERY 4 HOURS PRN
Qty: 42 TABLET | Refills: 0 | Status: SHIPPED | OUTPATIENT
Start: 2022-12-05 | End: 2022-12-12

## 2022-12-05 ASSESSMENT — ENCOUNTER SYMPTOMS
EYES NEGATIVE: 1
GASTROINTESTINAL NEGATIVE: 1
RESPIRATORY NEGATIVE: 1

## 2022-12-05 NOTE — PROGRESS NOTES
NEUROSURGERY POSTOPERATIVE PROGRESS NOTE      Chief Complaint:   Chief Complaint   Patient presents with    Wound Check     Patient is here after C3-6 LAMINECTOMY and HFU. He states that he is feeling fine since hospital, post op soreness. He states that he is having back pain today. He states that he is having headaches with some dizziness but it is not as bad as it was before surgery. Other     He states that he is running out of medication. Date of Surgery: 11/22/2022    Procedure Performed: C3-6 laminectomy and fusion      Interval Update:    First post-operative follow-up visit after hospital discharge. He is doing well overall. He reports that the strength and sensation in his hands is improving. He is experiencing the expected postoperative neck pain. He denies any new radicular pain or weakness. He denies any concerns with his incision. His main complaint today is actually pain in his lower back. Aisha Thomas HPI:     Patient known to me from recent in-office evaluation with plan for surgery on 11/29. His myelopathic symptoms appear to be worsening. His gait and balance impairment is progressing and he has new numbness in his abdomen. Exam remains significant for bilateral Lyon's, numbness in the hands and ataxic gait. His wife is having difficulty caring for him at home. Objective:    /82   Pulse 77   Resp 18   Ht 5' 7\" (1.702 m)   Wt 165 lb (74.8 kg)   BMI 25.84 kg/m²       Physical Exam:    General: alert, cooperative, no distress, c-collar in place  Cardiorespiratory: unlabored breathing  Wound: C/D/I with staples, no swelling, erythema or fluctuance. Neurologic Exam:    Mental Status: Alert, oriented, thought content appropriate  Cranial Nerves: PERRL, EOMI, symmetric facies, tongue midline  Motor: 5/5 BUE deltoid, biceps, triceps, 4+/5 .   5/5 BLE HF, KE, ADF, APF  Somatosensory: Decreased to light touch in the bilateral hands and feet, subjectively improved since surgery            Assessment and Plan:    62 y.o. M s/p C3-6 laminectomy and fusion on 11/22/2022 for progressive cervical myelopathy with severe spinal cord compression. He is doing well postoperatively.       - Wound healing well, will follow up in 1 week for staple removal   - I provided the patient with a refill of 10mg percocet (1 tab q4h) of sufficient quantity to last until his next appointment with pain management  - Patient to start home health PT/OT today  - Continue c-collar at all times except for meals and hygeine      Electronically signed by Smiley Mujica MD on 12/5/2022 at 9:57 AM

## 2022-12-05 NOTE — PROGRESS NOTES
Review of Systems   Constitutional: Negative. HENT: Negative. Eyes: Negative. Respiratory: Negative. Cardiovascular: Negative. Gastrointestinal: Negative. Genitourinary: Negative. Musculoskeletal:  Positive for joint pain, myalgias and neck pain. Skin: Negative. Neurological:  Positive for dizziness, tingling, weakness and headaches. Endo/Heme/Allergies: Negative. Psychiatric/Behavioral: Negative.

## 2022-12-14 ENCOUNTER — OFFICE VISIT (OUTPATIENT)
Dept: NEUROSURGERY | Age: 57
End: 2022-12-14

## 2022-12-14 VITALS
SYSTOLIC BLOOD PRESSURE: 132 MMHG | DIASTOLIC BLOOD PRESSURE: 80 MMHG | WEIGHT: 165 LBS | BODY MASS INDEX: 25.9 KG/M2 | RESPIRATION RATE: 18 BRPM | HEIGHT: 67 IN | HEART RATE: 77 BPM

## 2022-12-14 DIAGNOSIS — Z98.1 S/P CERVICAL SPINAL FUSION: Primary | ICD-10-CM

## 2022-12-14 DIAGNOSIS — G95.9 CERVICAL MYELOPATHY (HCC): ICD-10-CM

## 2022-12-14 PROCEDURE — 99024 POSTOP FOLLOW-UP VISIT: CPT | Performed by: NEUROLOGICAL SURGERY

## 2022-12-14 ASSESSMENT — ENCOUNTER SYMPTOMS
EYES NEGATIVE: 1
GASTROINTESTINAL NEGATIVE: 1
RESPIRATORY NEGATIVE: 1

## 2022-12-14 NOTE — PROGRESS NOTES
NEUROSURGERY POSTOPERATIVE PROGRESS NOTE      Chief Complaint:   Chief Complaint   Patient presents with    Suture / Staple Removal     Patient is here for staple removal and states that he is feeling better than last visit. He states that his neck is stiff. Date of Surgery: 11/22/2022    Procedure Performed: C3-6 laminectomy and fusion      Interval Update:    Planned post-operative follow-up visit for a wound check and staple removal.  He is doing well overall. His neck pain is improving, though he continues to have muscle spasms and some stiffness. He reports improvement in the sensation and dexterity in his hands. His balance and walking have also improved. He denies any new radicular pain or weakness. HPI:     Patient known to me from recent in-office evaluation with plan for surgery on 11/29. His myelopathic symptoms appear to be worsening. His gait and balance impairment is progressing and he has new numbness in his abdomen. Exam remains significant for bilateral Lyon's, numbness in the hands and ataxic gait. His wife is having difficulty caring for him at home. Objective:    /80   Pulse 77   Resp 18   Ht 5' 7\" (1.702 m)   Wt 165 lb (74.8 kg)   BMI 25.84 kg/m²       Physical Exam:    General: alert, cooperative, no distress, c-collar in place  Cardiorespiratory: unlabored breathing  Wound: C/D/I with staples, no swelling, erythema or fluctuance. Staples removed. Neurologic Exam:    Mental Status: Alert, oriented, thought content appropriate  Cranial Nerves: PERRL, EOMI, symmetric facies, tongue midline  Motor: 5/5 BUE deltoid, biceps, triceps, 4+/5  and hand intrinsics.   5/5 BLE HF, KE, ADF, APF  Somatosensory: Decreased to light touch in the bilateral hands and feet, subjectively improved since surgery            Assessment and Plan:    62 y.o. M s/p C3-6 laminectomy and fusion on 11/22/2022 for progressive cervical myelopathy with severe spinal cord compression. He is doing well postoperatively.       - Wound healing well, staples removed   - Continue home health PT/OT  - Continue c-collar, OK to remove for meals, sleep and hygeine  - Follow up in 1 month with c-spine x-rays      Electronically signed by Eryn Wood MD on 12/14/2022 at 11:35 AM

## 2023-01-16 ENCOUNTER — OFFICE VISIT (OUTPATIENT)
Dept: NEUROSURGERY | Age: 58
End: 2023-01-16

## 2023-01-16 ENCOUNTER — HOSPITAL ENCOUNTER (OUTPATIENT)
Dept: GENERAL RADIOLOGY | Age: 58
Discharge: HOME | End: 2023-01-16
Payer: MEDICARE

## 2023-01-16 VITALS
HEART RATE: 77 BPM | BODY MASS INDEX: 25.9 KG/M2 | WEIGHT: 165 LBS | HEIGHT: 67 IN | DIASTOLIC BLOOD PRESSURE: 80 MMHG | SYSTOLIC BLOOD PRESSURE: 145 MMHG | RESPIRATION RATE: 18 BRPM

## 2023-01-16 DIAGNOSIS — Z98.1 S/P CERVICAL SPINAL FUSION: ICD-10-CM

## 2023-01-16 DIAGNOSIS — G95.9 CERVICAL MYELOPATHY (HCC): ICD-10-CM

## 2023-01-16 DIAGNOSIS — Z98.1 S/P CERVICAL SPINAL FUSION: Primary | ICD-10-CM

## 2023-01-16 PROCEDURE — 72040 X-RAY EXAM NECK SPINE 2-3 VW: CPT

## 2023-01-16 PROCEDURE — 99024 POSTOP FOLLOW-UP VISIT: CPT | Performed by: NEUROLOGICAL SURGERY

## 2023-01-16 PROCEDURE — 72040 X-RAY EXAM NECK SPINE 2-3 VW: CPT | Performed by: RADIOLOGY

## 2023-01-16 ASSESSMENT — ENCOUNTER SYMPTOMS
RESPIRATORY NEGATIVE: 1
EYES NEGATIVE: 1
GASTROINTESTINAL NEGATIVE: 1

## 2023-01-16 NOTE — PROGRESS NOTES
Review of Systems   Constitutional: Negative. HENT: Negative. Eyes: Negative. Respiratory: Negative. Cardiovascular: Negative. Gastrointestinal: Negative. Genitourinary: Negative. Musculoskeletal:  Positive for joint pain, myalgias and neck pain. Skin: Negative. Neurological:  Positive for tingling, sensory change, weakness and headaches. Endo/Heme/Allergies: Negative. Psychiatric/Behavioral: Negative.

## 2023-01-16 NOTE — PROGRESS NOTES
NEUROSURGERY POSTOPERATIVE PROGRESS NOTE      Chief Complaint:   Chief Complaint   Patient presents with    Follow-up     Patient states that his neck pain is better than last visit. His complaint now is his hands hurt and are still numbness. He cannot  or do simple tasks like turn the lamp off. Date of Surgery: 11/22/2022    Procedure Performed: C3-6 laminectomy and fusion      Interval Update:    Planned post-operative follow-up visit. He is doing well. His neck pain continues to improve and his walking is nearly back to normal after surgery. He reports improvement in the sensation and dexterity in his hands, though he still has some issues. HPI:     Patient known to me from recent in-office evaluation with plan for surgery on 11/29. His myelopathic symptoms appear to be worsening. His gait and balance impairment is progressing and he has new numbness in his abdomen. Exam remains significant for bilateral Lyon's, numbness in the hands and ataxic gait. His wife is having difficulty caring for him at home. Objective:    BP (!) 145/80   Pulse 77   Resp 18   Ht 5' 7\" (1.702 m)   Wt 165 lb (74.8 kg)   BMI 25.84 kg/m²       Physical Exam:    General: alert, cooperative, no distress, c-collar in place  Cardiorespiratory: unlabored breathing  Wound: Healed    Neurologic Exam:    Mental Status: Alert, oriented, thought content appropriate  Cranial Nerves: PERRL, EOMI, symmetric facies, tongue midline  Motor: 5/5 BUE deltoid, biceps, triceps, 5-/5  and hand intrinsics. 5/5 BLE HF, KE, ADF, APF  Somatosensory: Decreased to light touch in the bilateral hands and feet, subjectively improved since surgery            Assessment and Plan:    62 y.o. M s/p C3-6 laminectomy and fusion on 11/22/2022 for progressive cervical myelopathy with severe spinal cord compression. He is doing well postoperatively.       - Refer for outpatient OT given ongoing complaints regarding dexterity with his hands and upper extremity strength  - DC cervical collar, obtain new x-rays today  - Follow up in 3 months      Electronically signed by Darrel Barraza MD on 1/16/2023 at 11:42 AM

## 2023-01-17 DIAGNOSIS — G95.20 CERVICAL SPINAL CORD COMPRESSION (HCC): ICD-10-CM

## 2023-01-17 DIAGNOSIS — G95.9 CERVICAL MYELOPATHY (HCC): Primary | ICD-10-CM

## 2023-01-17 DIAGNOSIS — R20.0 HAND NUMBNESS: ICD-10-CM

## 2023-01-17 DIAGNOSIS — Z98.1 S/P CERVICAL SPINAL FUSION: ICD-10-CM

## 2023-01-20 ENCOUNTER — TELEPHONE (OUTPATIENT)
Dept: NEUROSURGERY | Age: 58
End: 2023-01-20

## 2023-01-20 NOTE — TELEPHONE ENCOUNTER
Patient's wife called and stated patient was initially doing well after surgery but now he is having increased numbness in his hands and weakness. She also states \"he is having trouble straightening out his legs. \" They were wanting to know the results of the XR Cervical Spine completed on 1/16/2023.     Please advise

## 2023-08-10 DIAGNOSIS — G95.9 CERVICAL MYELOPATHY (HCC): ICD-10-CM

## 2023-08-10 DIAGNOSIS — M43.17 SPONDYLOLISTHESIS OF LUMBOSACRAL REGION: ICD-10-CM

## 2023-08-10 RX ORDER — GABAPENTIN 600 MG/1
600 TABLET ORAL 3 TIMES DAILY
Qty: 90 TABLET | Refills: 5 | Status: SHIPPED | OUTPATIENT
Start: 2023-08-10 | End: 2024-02-06

## 2023-09-27 ENCOUNTER — TELEPHONE (OUTPATIENT)
Dept: NEUROSURGERY | Age: 58
End: 2023-09-27

## 2023-09-27 NOTE — TELEPHONE ENCOUNTER
Patient called related to thinking that he missed his appointment. Patient requesting call back to reschedule.

## 2023-11-29 ENCOUNTER — TELEPHONE (OUTPATIENT)
Dept: NEUROSURGERY | Age: 58
End: 2023-11-29

## 2024-09-06 ENCOUNTER — TRANSCRIBE ORDERS (OUTPATIENT)
Dept: ADMINISTRATIVE | Age: 59
End: 2024-09-06

## 2024-09-06 DIAGNOSIS — R94.5 ABNORMAL RESULTS OF LIVER FUNCTION STUDIES: Primary | ICD-10-CM

## 2025-05-20 ENCOUNTER — TRANSCRIBE ORDERS (OUTPATIENT)
Dept: ADMINISTRATIVE | Age: 60
End: 2025-05-20

## 2025-05-20 DIAGNOSIS — M54.17 RADICULOPATHY, LUMBOSACRAL REGION: ICD-10-CM

## 2025-05-20 DIAGNOSIS — G56.03 CARPAL TUNNEL SYNDROME, BILATERAL UPPER LIMBS: Primary | ICD-10-CM

## (undated) DEVICE — FORCEP BPLR IRIS

## (undated) DEVICE — JP 3-SPRING RES W/10FR PVC DRAIN/TR: Brand: CARDINAL HEALTH

## (undated) DEVICE — BOWL ASSY BM210 DUAL BLADE DISPOSABLE: Brand: MIDAS REX™

## (undated) DEVICE — ELECTROSURGICAL PENCIL BUTTON SWITCH NON COATED BLADE ELECTRODE 10 FT (3 M) CORD HOLSTER: Brand: MEGADYNE

## (undated) DEVICE — TOOL MR8-14BA20 MR8 14CM BALL 2MM: Brand: MIDAS REX MR8

## (undated) DEVICE — COLLAR CERV UNIV AD L13-19IN TRACH OPN TWO PC RIG POLYETH

## (undated) DEVICE — C-ARMOR C-ARM EQUIPMENT COVERS CLEAR STERILE UNIVERSAL FIT 12 PER CASE: Brand: C-ARMOR

## (undated) DEVICE — TOOL MR8-14MH30 MR8 14CM MATCH 3MM: Brand: MIDAS REX MR8

## (undated) DEVICE — HYPODERMIC SAFETY NEEDLE: Brand: MAGELLAN

## (undated) DEVICE — BAG BND W36XL36IN TRNSPAR POLY GEN PURP W E BND CLSR TIDI

## (undated) DEVICE — TIBURON LAPAROTOMY DRAPE: Brand: CONVERTORS

## (undated) DEVICE — MICRO KOVER: Brand: UNBRANDED

## (undated) DEVICE — SUTURE VCRL SZ 2-0 L18IN ABSRB UD CT-1 L36MM 1/2 CIR J839D

## (undated) DEVICE — GLOVE SURG SZ 8 L12IN FNGR THK94MIL TRNSLUC YEL LTX HYDRGEL

## (undated) DEVICE — UNDERGLOVE SURG SZ 8 FNGR THK0.21MIL GRN LTX BEAD CUF

## (undated) DEVICE — DRAIN JACKSONPRATT FL10MM3/4PF: Brand: CARDINAL HEALTH

## (undated) DEVICE — AGENT HEMSTAT 8ML FLX TIP MTRX + DISP SURGIFLO

## (undated) DEVICE — SUTURE VCRL SZ 1 L18IN ABSRB UD L36MM CT-1 1/2 CIR J841D

## (undated) DEVICE — MAYFIELD® DISPOSABLE ADULT SKULL PIN (STAINLESS STEEL): Brand: MAYFIELD®

## (undated) DEVICE — TOTAL TRAY, 16FR 10ML SIL FOLEY, URN: Brand: MEDLINE

## (undated) DEVICE — SURGICAL SUCTION CONNECTING TUBE WITH MALE CONNECTOR AND SUCTION CLAMP, 2 FT. LONG (.6 M), 5 MM I.D.: Brand: CONMED

## (undated) DEVICE — CODMAN® SURGICAL PATTIES 1/2" X 3" (1.27CM X 7.62CM): Brand: CODMAN®

## (undated) DEVICE — DRILL BIT G3606010 2.4MM

## (undated) DEVICE — CAUTERY TIP POLISHER: Brand: DEVON

## (undated) DEVICE — NEURO CDS